# Patient Record
Sex: MALE | Race: WHITE | NOT HISPANIC OR LATINO | Employment: OTHER | URBAN - METROPOLITAN AREA
[De-identification: names, ages, dates, MRNs, and addresses within clinical notes are randomized per-mention and may not be internally consistent; named-entity substitution may affect disease eponyms.]

---

## 2023-12-21 ENCOUNTER — TELEPHONE (OUTPATIENT)
Dept: PRIMARY CARE | Facility: CLINIC | Age: 88
End: 2023-12-21

## 2023-12-21 DIAGNOSIS — Z00.00 WELLNESS EXAMINATION: Primary | ICD-10-CM

## 2023-12-21 NOTE — TELEPHONE ENCOUNTER
Patient forwarded an email to me regarding refill on lisinopril 5 mg tablet, to be sent to [facility].    Please contact patient to clarify request as well as his plans regarding future PCP    Abdoulaye Mckay MD

## 2023-12-22 RX ORDER — ATORVASTATIN CALCIUM 80 MG/1
0.5 TABLET, FILM COATED ORAL DAILY
COMMUNITY
Start: 2012-11-20

## 2023-12-22 RX ORDER — ACETAMINOPHEN 500 MG
1 TABLET ORAL DAILY
COMMUNITY
Start: 2018-10-04 | End: 2024-05-06 | Stop reason: DRUGHIGH

## 2023-12-22 RX ORDER — LATANOPROST 50 UG/ML
1 SOLUTION/ DROPS OPHTHALMIC NIGHTLY
COMMUNITY
Start: 2023-11-08

## 2023-12-22 RX ORDER — FERROUS SULFATE 325(65) MG
325 TABLET ORAL
COMMUNITY
Start: 2022-05-10

## 2023-12-22 RX ORDER — MEMANTINE HYDROCHLORIDE 10 MG/1
10 TABLET ORAL 2 TIMES DAILY
COMMUNITY
Start: 2023-08-22 | End: 2024-08-21

## 2023-12-22 RX ORDER — LISINOPRIL 5 MG/1
5 TABLET ORAL
COMMUNITY
Start: 2023-06-29 | End: 2023-12-22 | Stop reason: ALTCHOICE

## 2023-12-22 RX ORDER — DORZOLAMIDE HYDROCHLORIDE AND TIMOLOL MALEATE 20; 5 MG/ML; MG/ML
SOLUTION/ DROPS OPHTHALMIC
COMMUNITY
Start: 2023-11-07 | End: 2024-05-06 | Stop reason: ALTCHOICE

## 2023-12-22 RX ORDER — DOXYCYCLINE 100 MG/1
50 TABLET ORAL DAILY
COMMUNITY
End: 2024-02-27 | Stop reason: ALTCHOICE

## 2023-12-22 RX ORDER — DONEPEZIL HYDROCHLORIDE 10 MG/1
1 TABLET, FILM COATED ORAL NIGHTLY
COMMUNITY
Start: 2023-06-28

## 2023-12-22 RX ORDER — TALC
0.5 POWDER (GRAM) TOPICAL DAILY
COMMUNITY

## 2023-12-22 RX ORDER — AMLODIPINE BESYLATE 5 MG/1
1 TABLET ORAL DAILY
COMMUNITY
End: 2024-05-06 | Stop reason: ALTCHOICE

## 2023-12-22 RX ORDER — TRIAMTERENE/HYDROCHLOROTHIAZID 37.5-25 MG
1 TABLET ORAL DAILY
COMMUNITY
Start: 2022-05-10 | End: 2024-05-06 | Stop reason: SDUPTHER

## 2023-12-22 RX ORDER — TIMOLOL MALEATE 5 MG/ML
1 SOLUTION/ DROPS OPHTHALMIC 2 TIMES DAILY
COMMUNITY
Start: 2013-11-01

## 2023-12-22 RX ORDER — ALFUZOSIN HYDROCHLORIDE 10 MG/1
10 TABLET, EXTENDED RELEASE ORAL
COMMUNITY
Start: 2023-09-20 | End: 2024-05-06 | Stop reason: SDUPTHER

## 2023-12-22 RX ORDER — CETIRIZINE HYDROCHLORIDE 10 MG/1
10 TABLET ORAL
COMMUNITY

## 2023-12-22 NOTE — TELEPHONE ENCOUNTER
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

## 2023-12-22 NOTE — TELEPHONE ENCOUNTER
I last saw patient in June.    He is treated for hypertension, the blood pressure was low at last visit at which time I stopped amlodipine and continued lisinopril and triamterene/hydrochlorothiazide (see note).  Patient was to follow-up with me in October (did not do so).    He does have dementia with concern regarding medication compliance/understanding of proper medications to be taking.    Recommend patient schedule office visit with me within the next month and bring in all his medication bottles    Abdoulaye Mckay MD

## 2023-12-27 NOTE — TELEPHONE ENCOUNTER
Patient was advised to schedule appointment within the next month but he stated that he's in [place] until the 1st week of May.  He scheduled an office visit for 5-6-24.  Last MWV was 5-22-23.  He was also advised to bring in medication bottles.

## 2024-02-02 NOTE — TELEPHONE ENCOUNTER
I have ordered lab work which patient should complete a few days prior to his visit with me in May.  I spoke with patient.  He is aware to get fasting lab work 2 to 3 days prior.  I have sent him an email with this information at his request.    Abdoulaye Mckay MD

## 2024-02-27 DIAGNOSIS — L71.9 ROSACEA: Primary | ICD-10-CM

## 2024-02-27 RX ORDER — DOXYCYCLINE 100 MG/1
100 CAPSULE ORAL 2 TIMES DAILY
COMMUNITY
End: 2024-02-27 | Stop reason: DRUGHIGH

## 2024-02-27 RX ORDER — DOXYCYCLINE 100 MG/1
50 TABLET ORAL DAILY
Qty: 45 TABLET | Refills: 2 | Status: SHIPPED | OUTPATIENT
Start: 2024-02-27

## 2024-02-27 RX ORDER — DOXYCYCLINE 100 MG/1
100 CAPSULE ORAL 2 TIMES DAILY
Qty: 180 CAPSULE | Refills: 0 | Status: CANCELLED | OUTPATIENT
Start: 2024-02-27

## 2024-02-27 NOTE — TELEPHONE ENCOUNTER
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  Pended for [facility] (I couldn't add refills).

## 2024-05-06 ENCOUNTER — OFFICE VISIT (OUTPATIENT)
Dept: PRIMARY CARE | Facility: CLINIC | Age: 89
End: 2024-05-06
Payer: MEDICARE

## 2024-05-06 VITALS
DIASTOLIC BLOOD PRESSURE: 70 MMHG | WEIGHT: 149 LBS | HEIGHT: 68 IN | SYSTOLIC BLOOD PRESSURE: 124 MMHG | OXYGEN SATURATION: 97 % | BODY MASS INDEX: 22.58 KG/M2 | HEART RATE: 64 BPM

## 2024-05-06 DIAGNOSIS — E55.9 VITAMIN D DEFICIENCY: ICD-10-CM

## 2024-05-06 DIAGNOSIS — R26.9 GAIT DISORDER: ICD-10-CM

## 2024-05-06 DIAGNOSIS — G30.1 MODERATE LATE ONSET ALZHEIMER'S DEMENTIA WITHOUT BEHAVIORAL DISTURBANCE, PSYCHOTIC DISTURBANCE, MOOD DISTURBANCE, OR ANXIETY (MULTI): ICD-10-CM

## 2024-05-06 DIAGNOSIS — E78.2 MIXED HYPERLIPIDEMIA: ICD-10-CM

## 2024-05-06 DIAGNOSIS — I10 PRIMARY HYPERTENSION: ICD-10-CM

## 2024-05-06 DIAGNOSIS — L71.9 ROSACEA: ICD-10-CM

## 2024-05-06 DIAGNOSIS — L57.0 ACTINIC KERATOSES: ICD-10-CM

## 2024-05-06 DIAGNOSIS — R35.0 BENIGN PROSTATIC HYPERPLASIA WITH URINARY FREQUENCY: ICD-10-CM

## 2024-05-06 DIAGNOSIS — J30.89 NON-SEASONAL ALLERGIC RHINITIS, UNSPECIFIED TRIGGER: ICD-10-CM

## 2024-05-06 DIAGNOSIS — F02.B0 MODERATE LATE ONSET ALZHEIMER'S DEMENTIA WITHOUT BEHAVIORAL DISTURBANCE, PSYCHOTIC DISTURBANCE, MOOD DISTURBANCE, OR ANXIETY (MULTI): ICD-10-CM

## 2024-05-06 DIAGNOSIS — H40.1131 PRIMARY OPEN ANGLE GLAUCOMA (POAG) OF BOTH EYES, MILD STAGE: ICD-10-CM

## 2024-05-06 DIAGNOSIS — Z00.00 WELLNESS EXAMINATION: Primary | ICD-10-CM

## 2024-05-06 DIAGNOSIS — N40.1 BENIGN PROSTATIC HYPERPLASIA WITH URINARY FREQUENCY: ICD-10-CM

## 2024-05-06 PROBLEM — N40.0 BENIGN PROSTATIC HYPERPLASIA: Status: ACTIVE | Noted: 2024-05-06

## 2024-05-06 PROCEDURE — 1159F MED LIST DOCD IN RCRD: CPT | Performed by: INTERNAL MEDICINE

## 2024-05-06 PROCEDURE — 3074F SYST BP LT 130 MM HG: CPT | Performed by: INTERNAL MEDICINE

## 2024-05-06 PROCEDURE — 3078F DIAST BP <80 MM HG: CPT | Performed by: INTERNAL MEDICINE

## 2024-05-06 PROCEDURE — UHSPHYS PR UH SELECT PHYSICAL: Performed by: INTERNAL MEDICINE

## 2024-05-06 PROCEDURE — 1036F TOBACCO NON-USER: CPT | Performed by: INTERNAL MEDICINE

## 2024-05-06 RX ORDER — MINERAL OIL
180 ENEMA (ML) RECTAL DAILY
COMMUNITY
End: 2024-05-06 | Stop reason: ALTCHOICE

## 2024-05-06 RX ORDER — TRIAMTERENE/HYDROCHLOROTHIAZID 37.5-25 MG
TABLET ORAL
Start: 2024-05-06

## 2024-05-06 RX ORDER — CHOLECALCIFEROL (VITAMIN D3) 25 MCG
1000 TABLET ORAL DAILY
COMMUNITY

## 2024-05-06 RX ORDER — ALFUZOSIN HYDROCHLORIDE 10 MG/1
10 TABLET, EXTENDED RELEASE ORAL
Qty: 90 TABLET | Refills: 3 | Status: SHIPPED | OUTPATIENT
Start: 2024-05-06

## 2024-05-06 RX ORDER — LISINOPRIL 5 MG/1
5 TABLET ORAL DAILY
COMMUNITY

## 2024-05-06 RX ORDER — BISMUTH SUBSALICYLATE 262 MG
1 TABLET,CHEWABLE ORAL DAILY
COMMUNITY

## 2024-05-06 ASSESSMENT — ENCOUNTER SYMPTOMS
DIZZINESS: 0
ARTHRALGIAS: 0
RHINORRHEA: 0
DYSPHORIC MOOD: 0
LOSS OF SENSATION IN FEET: 0
HEMATURIA: 0
HEADACHES: 0
CONSTIPATION: 1
PALPITATIONS: 0
DYSURIA: 0
OCCASIONAL FEELINGS OF UNSTEADINESS: 0
NERVOUS/ANXIOUS: 0
FATIGUE: 0
COUGH: 0
FREQUENCY: 0
SHORTNESS OF BREATH: 0
BACK PAIN: 0
DIARRHEA: 0

## 2024-05-06 NOTE — PROGRESS NOTES
Subjective   Patient ID: Raul Funez is a 89 y.o. male who presents for Annual Exam.    Wellness exam/physical  (Established patient)    Primary hypertension  Blood pressure today is at goal.  Patient is currently taking lisinopril 5 mg daily and triamterene/hydrochlorothiazide, 1/2 tablet 4 days a week    Hyperlipidemia  Patient is on atorvastatin 40 mg daily.  He maintains well-balanced diet.  He exercises daily.    BPH (benign prostatic hypertrophy)  Urinary stream adequate.  No increased hesitancy or frequency.  He remains on alfuzosin 10 mg daily    Dementia, Alzheimer's type  Patient is on dual therapy with donepezil 10 mg daily and memantine 10 mg twice a day.  He last saw his [facility] neurologist, Dr. Morris, in September.  Patient admits to short-term memory loss.  During visit today, evidence of repeated questions noted.  Patient intermittently confused regarding current location.  I spoke with his wife.  She states memory appears somewhat stable.  Patient is still driving and recommendation for consideration of formal driving evaluation discussed.  Patient agreeable to second opinion and ongoing follow-up with  providers.    Gait disorder  Similar shuffling gait noted.  Patient with history of falls, though states he has not fallen recently.  He is not using any ambulatory device    Vitamin D deficiency  Patient is currently on vitamin D 1000 IU daily    Allergic rhinitis  Patient states he is allergic to family dog.  He is taking both Zyrtec 10 mg daily and Allegra 180 mg daily (1 in the morning, 1 in the evening, does not recall which he takes at which time a day).  Duplication of therapy and potential side effects (increased memory loss, urinary retention).  Patient is somewhat adamant that this regimen is working well and he chooses to continue.    Bilateral glaucoma  Routine follow-up with ophthalmology, Dr. Orta    Actinic keratoses  Rosacea  Last dermatology appointment was 2 years ago  "with Dr. Boyd at [facility].  Patient recommended to see dermatology and will see  provider.    Health maintenance  Exercise: Daily exercise   Ophthalmology: See above  Dentistry: Up-to-date  Dermatology: See above    Social   and lives with his wife, spends 6 months in [place] at [place] (October through April)  Education: [employer] for undergrad and [employer] for medical school, graduate in 1960  Occupation: Retired primary care/cardiologist           Review of Systems   Constitutional:  Negative for fatigue.   HENT:  Negative for postnasal drip and rhinorrhea.    Respiratory:  Negative for cough and shortness of breath.    Cardiovascular:  Negative for chest pain, palpitations and leg swelling.   Gastrointestinal:  Positive for constipation. Negative for diarrhea.   Genitourinary:  Negative for dysuria, frequency and hematuria.   Musculoskeletal:  Positive for gait problem. Negative for arthralgias and back pain.   Neurological:  Negative for dizziness and headaches.   Psychiatric/Behavioral:  Negative for dysphoric mood. The patient is not nervous/anxious.        Objective   /70 (BP Location: Left arm, Patient Position: Sitting, BP Cuff Size: Adult)   Pulse 64   Ht 1.715 m (5' 7.5\")   Wt 67.6 kg (149 lb)   SpO2 97%   BMI 22.99 kg/m²     Physical Exam  Vitals reviewed.   HENT:      Head: Normocephalic.      Right Ear: Tympanic membrane normal.      Left Ear: Tympanic membrane normal.      Mouth/Throat:      Mouth: Mucous membranes are moist.   Eyes:      Conjunctiva/sclera: Conjunctivae normal.      Pupils: Pupils are equal, round, and reactive to light.   Neck:      Vascular: No carotid bruit.   Cardiovascular:      Rate and Rhythm: Normal rate and regular rhythm.      Heart sounds: No murmur heard.  Pulmonary:      Effort: Pulmonary effort is normal.      Breath sounds: Normal breath sounds. No rales.   Abdominal:      General: Bowel sounds are normal.      Tenderness: There is no " abdominal tenderness.   Musculoskeletal:         General: Normal range of motion.      Right lower leg: No edema.      Left lower leg: No edema.   Lymphadenopathy:      Cervical: No cervical adenopathy.   Skin:     General: Skin is warm.   Neurological:      Mental Status: He is alert.      Comments: Oriented to person and place  Normal speech and facial movement  Motor exam: 5/5 in all 4 extremities  DTRs: 1+ in all 4 extremities  Coordination: Small steps with shuffling gait.  No tremor.  No upper extremity stiffness or cogwheeling         Assessment/Plan     Wellness exam/physical  Well-balanced diet rich in fruits, vegetables, fiber, lean protein recommended  Continued routine follow-up with dentistry recommended  Tdap vaccine recommended (to receive at pharmacy)  COVID-19 booster vaccine recommended (to receive at pharmacy)  Providing copy of advance directives (DURABLE POWER OF  for healthcare) to place in chart recommended    Primary hypertension  Continue current medication regimen with lisinopril 5 mg daily and triamterene/hydrochlorothiazide, 1/2 tablet 3 days a week on Monday, Wednesday, Friday  Monitor and bring home blood pressure readings and device to next visit    Hyperlipidemia  Continue atorvastatin  Fasting lab work ordered  Maintain proper low-fat/cholesterol, high-fiber diet    BPH (benign prostatic hypertrophy)  Continue alfuzosin 10 mg daily    Dementia, Alzheimer's type  Continue donepezil 10 mg daily and memantine 10 mg twice a day  Referral to [facility] for memory evaluation  Recommendation for driving evaluation discussed with wife    Gait disorder  Continue fall precautions are recommended  Use of ambulatory device such as cane or walker/rollator is recommended    Vitamin D deficiency  Continue vitamin D 1000 IU daily  Vitamin D lab work ordered    Allergic rhinitis  Continue Zyrtec 10 mg in the morning and Allegra 180 mg in the evening (reducing to one of the other medication  recommended, the patient to continue current therapy)    Bilateral glaucoma  Continue current glaucoma eyedrops  Continue routine follow-up with ophthalmology, Dr. Orta    Actinic keratoses  Rosacea  Recommend annual dermatology follow-up for full body skin exam, appointment to be scheduled with  dermatology    Follow-up  1.  Office visit in September 2.  Wellness exam/physical in 1 year, on/after 5/6/2025    Abdoulaye Mckay MD

## 2024-05-06 NOTE — PATIENT INSTRUCTIONS
Wellness exam/physical  Well-balanced diet rich in fruits, vegetables, fiber, lean protein recommended  Continued routine follow-up with dentistry recommended  Tdap vaccine recommended (to receive at pharmacy)  COVID-19 booster vaccine recommended (to receive at pharmacy)  Providing copy of advance directives (DURABLE POWER OF  for healthcare) to place in chart recommended    Primary hypertension  Continue current medication regimen with lisinopril 5 mg daily and triamterene/hydrochlorothiazide, 1/2 tablet 3 days a week on Monday, Wednesday, Friday  Monitor and bring home blood pressure readings and device to next visit    Hyperlipidemia  Continue atorvastatin  Fasting lab work ordered  Maintain proper low-fat/cholesterol, high-fiber diet    BPH (benign prostatic hypertrophy)  Continue alfuzosin 10 mg daily    Dementia, Alzheimer's type  Continue donepezil 10 mg daily and memantine 10 mg twice a day  Referral to [facility] for memory evaluation    Gait disorder  Continue fall precautions are recommended  Use of ambulatory device such as cane or walker/rollator is recommended    Vitamin D deficiency  Continue vitamin D 1000 IU daily  Vitamin D lab work ordered    Allergic rhinitis  Continue Zyrtec 10 mg in the morning and Allegra 180 mg in the evening (reducing to one of the other medication recommended, the patient to continue current therapy)    Bilateral glaucoma  Continue current glaucoma eyedrops  Continue routine follow-up with ophthalmology, Dr. Orta    Actinic keratoses  Rosacea  Recommend annual dermatology follow-up for full body skin exam, appointment to be scheduled with  dermatology    Follow-up  1.  Office visit in September 2.  Wellness exam/physical in 1 year, on/after 5/6/2025

## 2024-05-06 NOTE — Clinical Note
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 He is looking to switch his primary care to .  I was hoping you might see him for an office visit/consult. He is in [place] October through April. Thank you and let me know.  Much appreciated.  Allen

## 2024-05-28 ENCOUNTER — TELEPHONE (OUTPATIENT)
Dept: PRIMARY CARE | Facility: CLINIC | Age: 89
End: 2024-05-28
Payer: MEDICARE

## 2024-05-28 DIAGNOSIS — H40.1131 PRIMARY OPEN ANGLE GLAUCOMA (POAG) OF BOTH EYES, MILD STAGE: ICD-10-CM

## 2024-05-28 NOTE — TELEPHONE ENCOUNTER
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  Order pended.  He just saw Dr. Person ([facility]) on 5-20-24.

## 2024-06-10 PROBLEM — I10 HYPERTENSION: Status: ACTIVE | Noted: 2024-06-10

## 2024-06-10 PROBLEM — R33.9 URINARY RETENTION: Status: RESOLVED | Noted: 2024-06-10 | Resolved: 2024-06-10

## 2024-06-10 PROBLEM — F03.90 DEMENTIA WITHOUT BEHAVIORAL DISTURBANCE (MULTI): Status: ACTIVE | Noted: 2023-09-13

## 2024-06-10 PROBLEM — K05.10 CHRONIC GINGIVITIS: Status: ACTIVE | Noted: 2020-11-18

## 2024-06-10 PROBLEM — R29.6 FALLS FREQUENTLY: Status: ACTIVE | Noted: 2023-06-19

## 2024-06-10 PROBLEM — M25.512 SHOULDER PAIN, LEFT: Status: ACTIVE | Noted: 2021-06-24

## 2024-06-10 PROBLEM — N52.9 ORGANIC IMPOTENCE: Status: ACTIVE | Noted: 2024-06-10

## 2024-06-10 PROBLEM — T70.29XA: Status: ACTIVE | Noted: 2019-08-05

## 2024-06-10 PROBLEM — R26.9 GAIT ABNORMALITY: Status: ACTIVE | Noted: 2020-11-24

## 2024-06-10 PROBLEM — E78.2 MIXED HYPERLIPIDEMIA: Status: ACTIVE | Noted: 2018-11-15

## 2024-06-10 PROBLEM — N40.1 BENIGN PROSTATIC HYPERPLASIA WITH URINARY FREQUENCY: Status: ACTIVE | Noted: 2018-11-15

## 2024-06-10 PROBLEM — L71.9 ROSACEA: Status: ACTIVE | Noted: 2020-11-18

## 2024-06-10 PROBLEM — H40.9 GLAUCOMA: Status: ACTIVE | Noted: 2020-11-18

## 2024-06-10 PROBLEM — E55.9 VITAMIN D DEFICIENCY: Status: ACTIVE | Noted: 2018-11-15

## 2024-06-10 PROBLEM — R35.0 BENIGN PROSTATIC HYPERPLASIA WITH URINARY FREQUENCY: Status: ACTIVE | Noted: 2018-11-15

## 2024-06-10 PROBLEM — H92.02 EARACHE ON LEFT: Status: ACTIVE | Noted: 2024-06-10

## 2024-06-10 PROBLEM — G31.84 MCI (MILD COGNITIVE IMPAIRMENT) WITH MEMORY LOSS: Status: ACTIVE | Noted: 2022-05-10

## 2024-06-10 PROBLEM — I65.29 CAROTID ATHEROSCLEROSIS: Status: ACTIVE | Noted: 2020-11-18

## 2024-06-10 PROBLEM — R33.8 ACUTE URINARY RETENTION: Status: RESOLVED | Noted: 2024-06-10 | Resolved: 2024-06-10

## 2024-06-10 PROBLEM — I10 BENIGN ESSENTIAL HYPERTENSION: Status: ACTIVE | Noted: 2018-11-15

## 2024-06-10 PROBLEM — L30.9 DERMATITIS: Status: ACTIVE | Noted: 2022-05-10

## 2024-06-10 PROBLEM — N41.9 PROSTATITIS: Status: ACTIVE | Noted: 2024-06-10

## 2024-06-10 PROBLEM — R97.20 HIGH PROSTATE SPECIFIC ANTIGEN (PSA): Status: ACTIVE | Noted: 2020-11-18

## 2024-06-10 PROBLEM — M25.511 PAIN IN JOINT OF RIGHT SHOULDER: Status: ACTIVE | Noted: 2020-11-18

## 2024-06-10 PROBLEM — Z98.890 HX OF DECOMPRESSIVE LUMBAR LAMINECTOMY: Status: ACTIVE | Noted: 2020-05-27

## 2024-06-10 PROBLEM — D22.5 MELANOCYTIC NEVI OF TRUNK: Status: ACTIVE | Noted: 2018-06-15

## 2024-06-10 PROBLEM — L82.1 OTHER SEBORRHEIC KERATOSIS: Status: ACTIVE | Noted: 2018-06-15

## 2024-06-10 PROBLEM — H61.20 WAX IN EAR: Status: RESOLVED | Noted: 2024-06-10 | Resolved: 2024-06-10

## 2024-06-10 PROBLEM — N52.9 ERECTILE DYSFUNCTION: Status: ACTIVE | Noted: 2020-11-18

## 2024-06-10 PROBLEM — H61.22 IMPACTED CERUMEN OF LEFT EAR: Status: ACTIVE | Noted: 2020-11-18

## 2024-06-10 PROBLEM — E78.5 HYPERLIPIDEMIA: Status: ACTIVE | Noted: 2024-06-10

## 2024-06-10 PROBLEM — R26.89 BALANCE DISORDER: Status: ACTIVE | Noted: 2023-06-19

## 2024-06-10 PROBLEM — M48.061 SPINAL STENOSIS OF LUMBAR REGION: Status: ACTIVE | Noted: 2020-05-27

## 2024-06-10 PROBLEM — S62.618A CLOSED FRACTURE OF PROXIMAL PHALANX OF LITTLE FINGER: Status: RESOLVED | Noted: 2021-04-14 | Resolved: 2024-06-10

## 2024-06-25 ENCOUNTER — APPOINTMENT (OUTPATIENT)
Dept: GERIATRIC MEDICINE | Facility: CLINIC | Age: 89
End: 2024-06-25
Payer: MEDICARE

## 2024-08-16 DIAGNOSIS — G30.1 MODERATE LATE ONSET ALZHEIMER'S DEMENTIA WITHOUT BEHAVIORAL DISTURBANCE, PSYCHOTIC DISTURBANCE, MOOD DISTURBANCE, OR ANXIETY (MULTI): ICD-10-CM

## 2024-08-16 DIAGNOSIS — F02.B0 MODERATE LATE ONSET ALZHEIMER'S DEMENTIA WITHOUT BEHAVIORAL DISTURBANCE, PSYCHOTIC DISTURBANCE, MOOD DISTURBANCE, OR ANXIETY (MULTI): ICD-10-CM

## 2024-08-16 RX ORDER — DONEPEZIL HYDROCHLORIDE 10 MG/1
10 TABLET, FILM COATED ORAL NIGHTLY
Qty: 90 TABLET | Refills: 3 | Status: SHIPPED | OUTPATIENT
Start: 2024-08-16

## 2024-09-16 ENCOUNTER — LAB (OUTPATIENT)
Dept: LAB | Facility: LAB | Age: 89
End: 2024-09-16
Payer: MEDICARE

## 2024-09-16 ENCOUNTER — APPOINTMENT (OUTPATIENT)
Dept: PRIMARY CARE | Facility: CLINIC | Age: 89
End: 2024-09-16
Payer: MEDICARE

## 2024-09-16 VITALS
HEART RATE: 52 BPM | BODY MASS INDEX: 22.68 KG/M2 | SYSTOLIC BLOOD PRESSURE: 128 MMHG | OXYGEN SATURATION: 96 % | DIASTOLIC BLOOD PRESSURE: 62 MMHG | WEIGHT: 147 LBS

## 2024-09-16 DIAGNOSIS — G30.1 MODERATE LATE ONSET ALZHEIMER'S DEMENTIA WITHOUT BEHAVIORAL DISTURBANCE, PSYCHOTIC DISTURBANCE, MOOD DISTURBANCE, OR ANXIETY (MULTI): ICD-10-CM

## 2024-09-16 DIAGNOSIS — E78.2 MIXED HYPERLIPIDEMIA: ICD-10-CM

## 2024-09-16 DIAGNOSIS — E55.9 VITAMIN D DEFICIENCY: ICD-10-CM

## 2024-09-16 DIAGNOSIS — I10 PRIMARY HYPERTENSION: ICD-10-CM

## 2024-09-16 DIAGNOSIS — F02.B0 MODERATE LATE ONSET ALZHEIMER'S DEMENTIA WITHOUT BEHAVIORAL DISTURBANCE, PSYCHOTIC DISTURBANCE, MOOD DISTURBANCE, OR ANXIETY (MULTI): ICD-10-CM

## 2024-09-16 DIAGNOSIS — R26.9 GAIT ABNORMALITY: ICD-10-CM

## 2024-09-16 DIAGNOSIS — J30.89 NON-SEASONAL ALLERGIC RHINITIS, UNSPECIFIED TRIGGER: ICD-10-CM

## 2024-09-16 DIAGNOSIS — N40.1 BENIGN PROSTATIC HYPERPLASIA WITH URINARY FREQUENCY: ICD-10-CM

## 2024-09-16 DIAGNOSIS — R35.0 BENIGN PROSTATIC HYPERPLASIA WITH URINARY FREQUENCY: ICD-10-CM

## 2024-09-16 DIAGNOSIS — I10 PRIMARY HYPERTENSION: Primary | ICD-10-CM

## 2024-09-16 PROBLEM — T70.29XA: Status: RESOLVED | Noted: 2019-08-05 | Resolved: 2024-09-16

## 2024-09-16 PROBLEM — G31.84 MCI (MILD COGNITIVE IMPAIRMENT) WITH MEMORY LOSS: Status: RESOLVED | Noted: 2022-05-10 | Resolved: 2024-09-16

## 2024-09-16 PROBLEM — M25.512 SHOULDER PAIN, LEFT: Status: RESOLVED | Noted: 2021-06-24 | Resolved: 2024-09-16

## 2024-09-16 PROBLEM — H61.22 IMPACTED CERUMEN OF LEFT EAR: Status: RESOLVED | Noted: 2020-11-18 | Resolved: 2024-09-16

## 2024-09-16 PROBLEM — E78.5 HYPERLIPIDEMIA: Status: RESOLVED | Noted: 2024-06-10 | Resolved: 2024-09-16

## 2024-09-16 PROBLEM — N41.9 PROSTATITIS: Status: RESOLVED | Noted: 2024-06-10 | Resolved: 2024-09-16

## 2024-09-16 PROBLEM — F03.90 DEMENTIA WITHOUT BEHAVIORAL DISTURBANCE (MULTI): Status: RESOLVED | Noted: 2023-09-13 | Resolved: 2024-09-16

## 2024-09-16 PROBLEM — R29.6 FALLS FREQUENTLY: Status: RESOLVED | Noted: 2023-06-19 | Resolved: 2024-09-16

## 2024-09-16 PROBLEM — N52.9 ORGANIC IMPOTENCE: Status: RESOLVED | Noted: 2024-06-10 | Resolved: 2024-09-16

## 2024-09-16 PROBLEM — R26.89 BALANCE DISORDER: Status: RESOLVED | Noted: 2023-06-19 | Resolved: 2024-09-16

## 2024-09-16 PROBLEM — H40.9 GLAUCOMA: Status: RESOLVED | Noted: 2020-11-18 | Resolved: 2024-09-16

## 2024-09-16 LAB
APPEARANCE UR: CLEAR
BILIRUB UR STRIP.AUTO-MCNC: NEGATIVE MG/DL
COLOR UR: YELLOW
GLUCOSE UR STRIP.AUTO-MCNC: NORMAL MG/DL
KETONES UR STRIP.AUTO-MCNC: NEGATIVE MG/DL
LEUKOCYTE ESTERASE UR QL STRIP.AUTO: NEGATIVE
NITRITE UR QL STRIP.AUTO: NEGATIVE
PH UR STRIP.AUTO: 6.5 [PH]
PROT UR STRIP.AUTO-MCNC: NEGATIVE MG/DL
RBC # UR STRIP.AUTO: NEGATIVE /UL
SP GR UR STRIP.AUTO: 1.02
UROBILINOGEN UR STRIP.AUTO-MCNC: NORMAL MG/DL

## 2024-09-16 PROCEDURE — 36415 COLL VENOUS BLD VENIPUNCTURE: CPT

## 2024-09-16 PROCEDURE — 99214 OFFICE O/P EST MOD 30 MIN: CPT | Performed by: INTERNAL MEDICINE

## 2024-09-16 PROCEDURE — 1159F MED LIST DOCD IN RCRD: CPT | Performed by: INTERNAL MEDICINE

## 2024-09-16 PROCEDURE — 3074F SYST BP LT 130 MM HG: CPT | Performed by: INTERNAL MEDICINE

## 2024-09-16 PROCEDURE — 3078F DIAST BP <80 MM HG: CPT | Performed by: INTERNAL MEDICINE

## 2024-09-16 RX ORDER — ATORVASTATIN CALCIUM 80 MG/1
40 TABLET, FILM COATED ORAL DAILY
Qty: 45 TABLET | Refills: 3 | Status: SHIPPED | OUTPATIENT
Start: 2024-09-16

## 2024-09-16 NOTE — PATIENT INSTRUCTIONS
Primary hypertension  Continue current medication regimen with lisinopril 5 mg daily and triamterene/hydrochlorothiazide, 1/2 tablet 3 days a week on Monday, Wednesday, Friday    Hyperlipidemia  Continue atorvastatin  Lab work ordered to complete today.  Maintain proper low-fat/cholesterol, high-fiber diet     BPH (benign prostatic hypertrophy)  Continue alfuzosin 10 mg daily     Dementia, Alzheimer's type  Continue donepezil 10 mg daily and memantine 10 mg twice a day  Referral to [facility] for memory evaluation  Recommendation for driving evaluation given.     Gait disorder  Continue fall precautions are recommended  Use of ambulatory device such as cane or walker/rollator is recommended     Vitamin D deficiency  Continue vitamin D 1000 IU daily     Allergic rhinitis  Continue Allegra     Bilateral glaucoma  Continue current glaucoma eyedrops  Continue routine follow-up with ophthalmology, Dr. Orta.  Next visit due in October (next month)     Actinic keratoses  Rosacea  Dermatology visit for full body skin exam is scheduled with Dr. Sánchez.    Health maintenance  COVID-19 and high-dose influenza vaccines are recommended to receive this month.    Follow-up  Wellness exam/physical in May 2025

## 2024-09-16 NOTE — PROGRESS NOTES
Subjective   Patient ID: Raul Funez is a 89 y.o. male who presents for Follow-up.    Routine follow-up    Primary hypertension  Blood pressure stable today on current regimen.    Hyperlipidemia  Lab work is due, never completed at time of physical.  Patient is compliant with atorvastatin.  Maintaining proper diet.     BPH (benign prostatic hypertrophy)  Urinary stream adequate.  Patient denies any dysuria.    Dementia, Alzheimer's type  Patient is on both donepezil and memantine  In answering questions, patient initially states he does not know, then is able to recall.  He is driving, denies any moving violations or accidents.  At previous visit, driving evaluation recommended.  I have again suggested to patient today.  I have also contacted his wife by phone and have provided driving evaluation option for testing.  Appointment with Dr. Carter pena was canceled in June, we will try to get an evaluation scheduled before he leaves for [place] in October.     Gait disorder  Patient denies any falls.  Not using any ambulatory device today.    Vitamin D deficiency  Currently on vitamin D 1000 IU daily     Allergic rhinitis  He denies any current symptoms, using Allegra     Bilateral glaucoma  Last visit with ophthalmology, Dr. Orta, was in May, due for appointment next month.  Wife has been advised to have patient's schedule.     Actinic keratoses  Rosacea  Dermatology visit for full body skin exam is scheduled with Dr. Sánchez.    Health maintenance  COVID-19 and high-dose influenza vaccines are recommended to receive this month.         Review of Systems   Constitutional:  Negative for fatigue.   Respiratory:  Negative for shortness of breath.    Cardiovascular:  Negative for chest pain and palpitations.   Gastrointestinal:  Negative for constipation and diarrhea.   Genitourinary:  Negative for dysuria and hematuria.   Musculoskeletal:  Positive for gait problem. Negative for arthralgias and back pain.    Neurological:  Negative for dizziness, weakness and headaches.       Objective   /62 (BP Location: Left arm, Patient Position: Sitting, BP Cuff Size: Adult)   Pulse 52   Wt 66.7 kg (147 lb)   SpO2 96%   BMI 22.68 kg/m²     Physical Exam  Vitals reviewed.   HENT:      Head: Normocephalic.      Mouth/Throat:      Mouth: Mucous membranes are moist.   Cardiovascular:      Rate and Rhythm: Normal rate and regular rhythm.   Pulmonary:      Effort: Pulmonary effort is normal.      Breath sounds: Normal breath sounds.   Abdominal:      General: Bowel sounds are normal.      Palpations: Abdomen is soft.      Tenderness: There is no abdominal tenderness.   Musculoskeletal:      Right lower leg: No edema.      Left lower leg: No edema.   Neurological:      Mental Status: He is alert.      Comments: Facial movement symmetric  Normal speech  Normal motor strength in all 4 extremities  Gait: Short shuffling gait  Oriented to person and place         Assessment/Plan     Primary hypertension  Continue current medication regimen with lisinopril 5 mg daily and triamterene/hydrochlorothiazide, 1/2 tablet 3 days a week on Monday, Wednesday, Friday    Hyperlipidemia  Continue atorvastatin  Lab work ordered to complete today.  Maintain proper low-fat/cholesterol, high-fiber diet     BPH (benign prostatic hypertrophy)  Continue alfuzosin 10 mg daily     Dementia, Alzheimer's type  Continue donepezil 10 mg daily and memantine 10 mg twice a day  Referral to [facility] for memory evaluation  Recommendation for driving evaluation given.  I have spoken with the patient as well as his wife and given options for evaluation.     Gait disorder  Continue fall precautions are recommended  Use of ambulatory device such as cane or walker/rollator is recommended     Vitamin D deficiency  Continue vitamin D 1000 IU daily     Allergic rhinitis  Continue Allegra     Bilateral glaucoma  Continue current glaucoma eyedrops  Continue routine follow-up  with ophthalmology, Dr. Orta.  Next visit due in October (next month)     Actinic keratoses  Rosacea  Dermatology visit for full body skin exam is scheduled with Dr. Sánchez.    Health maintenance  COVID-19 and high-dose influenza vaccines are recommended to receive this month.    Follow-up  Wellness exam/physical in May 2025    Abdoulaye Mckay MD

## 2024-09-16 NOTE — Clinical Note
Bharati, I hope that your summer has gone well. I previously referred this patient to in May and you were kind enough to schedule him in June (though he may have canceled the appointment). He is a retired  cardiologist with dementia.  He and his wife return to [place] sometime in October/November. I am checking to see if you might have a cancellation or way that he can see you prior to his departure. If there is one of your colleagues that is available, that would be fine as well. Thank you so much for your consideration. Allen

## 2024-09-17 LAB
25(OH)D3 SERPL-MCNC: 76 NG/ML (ref 30–100)
ALBUMIN SERPL BCP-MCNC: 4 G/DL (ref 3.4–5)
ALP SERPL-CCNC: 56 U/L (ref 33–136)
ALT SERPL W P-5'-P-CCNC: 17 U/L (ref 10–52)
ANION GAP SERPL CALC-SCNC: 14 MMOL/L (ref 10–20)
AST SERPL W P-5'-P-CCNC: 21 U/L (ref 9–39)
BASOPHILS # BLD AUTO: 0.06 X10*3/UL (ref 0–0.1)
BASOPHILS NFR BLD AUTO: 0.8 %
BILIRUB SERPL-MCNC: 0.5 MG/DL (ref 0–1.2)
BUN SERPL-MCNC: 16 MG/DL (ref 6–23)
CALCIUM SERPL-MCNC: 9.3 MG/DL (ref 8.6–10.6)
CHLORIDE SERPL-SCNC: 104 MMOL/L (ref 98–107)
CHOLEST SERPL-MCNC: 146 MG/DL (ref 0–199)
CHOLESTEROL/HDL RATIO: 2.5
CO2 SERPL-SCNC: 27 MMOL/L (ref 21–32)
CREAT SERPL-MCNC: 1.01 MG/DL (ref 0.5–1.3)
EGFRCR SERPLBLD CKD-EPI 2021: 71 ML/MIN/1.73M*2
EOSINOPHIL # BLD AUTO: 0.24 X10*3/UL (ref 0–0.4)
EOSINOPHIL NFR BLD AUTO: 3.4 %
ERYTHROCYTE [DISTWIDTH] IN BLOOD BY AUTOMATED COUNT: 13.8 % (ref 11.5–14.5)
GLUCOSE SERPL-MCNC: 95 MG/DL (ref 74–99)
HCT VFR BLD AUTO: 41.3 % (ref 41–52)
HDLC SERPL-MCNC: 58.5 MG/DL
HGB BLD-MCNC: 13.3 G/DL (ref 13.5–17.5)
IMM GRANULOCYTES # BLD AUTO: 0.04 X10*3/UL (ref 0–0.5)
IMM GRANULOCYTES NFR BLD AUTO: 0.6 % (ref 0–0.9)
LDLC SERPL CALC-MCNC: 64 MG/DL
LYMPHOCYTES # BLD AUTO: 1.85 X10*3/UL (ref 0.8–3)
LYMPHOCYTES NFR BLD AUTO: 26.2 %
MCH RBC QN AUTO: 31.1 PG (ref 26–34)
MCHC RBC AUTO-ENTMCNC: 32.2 G/DL (ref 32–36)
MCV RBC AUTO: 97 FL (ref 80–100)
MONOCYTES # BLD AUTO: 0.68 X10*3/UL (ref 0.05–0.8)
MONOCYTES NFR BLD AUTO: 9.6 %
NEUTROPHILS # BLD AUTO: 4.2 X10*3/UL (ref 1.6–5.5)
NEUTROPHILS NFR BLD AUTO: 59.4 %
NON HDL CHOLESTEROL: 88 MG/DL (ref 0–149)
NRBC BLD-RTO: 0 /100 WBCS (ref 0–0)
PLATELET # BLD AUTO: 307 X10*3/UL (ref 150–450)
POTASSIUM SERPL-SCNC: 4.6 MMOL/L (ref 3.5–5.3)
PROT SERPL-MCNC: 6 G/DL (ref 6.4–8.2)
RBC # BLD AUTO: 4.28 X10*6/UL (ref 4.5–5.9)
SODIUM SERPL-SCNC: 140 MMOL/L (ref 136–145)
TRIGL SERPL-MCNC: 119 MG/DL (ref 0–149)
TSH SERPL-ACNC: 2.36 MIU/L (ref 0.44–3.98)
VIT B12 SERPL-MCNC: 554 PG/ML (ref 211–911)
VLDL: 24 MG/DL (ref 0–40)
WBC # BLD AUTO: 7.1 X10*3/UL (ref 4.4–11.3)

## 2024-09-20 ASSESSMENT — ENCOUNTER SYMPTOMS
BACK PAIN: 0
WEAKNESS: 0
HEADACHES: 0
HEMATURIA: 0
ARTHRALGIAS: 0
PALPITATIONS: 0
CONSTIPATION: 0
DIZZINESS: 0
SHORTNESS OF BREATH: 0
DIARRHEA: 0
DYSURIA: 0
FATIGUE: 0

## 2024-09-30 ENCOUNTER — APPOINTMENT (OUTPATIENT)
Dept: DERMATOLOGY | Facility: CLINIC | Age: 89
End: 2024-09-30
Payer: MEDICARE

## 2024-09-30 DIAGNOSIS — D22.9 MULTIPLE BENIGN NEVI: ICD-10-CM

## 2024-09-30 DIAGNOSIS — L57.0 ACTINIC KERATOSIS: Primary | ICD-10-CM

## 2024-09-30 PROCEDURE — 1159F MED LIST DOCD IN RCRD: CPT | Performed by: STUDENT IN AN ORGANIZED HEALTH CARE EDUCATION/TRAINING PROGRAM

## 2024-09-30 PROCEDURE — 99213 OFFICE O/P EST LOW 20 MIN: CPT | Performed by: STUDENT IN AN ORGANIZED HEALTH CARE EDUCATION/TRAINING PROGRAM

## 2024-09-30 ASSESSMENT — DERMATOLOGY PATIENT ASSESSMENT
HAVE YOU HAD OR DO YOU HAVE A STAPH INFECTION: NO
DO YOU USE A TANNING BED: NO
HAVE YOU HAD OR DO YOU HAVE VASCULAR DISEASE: NO
DO YOU USE SUNSCREEN: OCCASIONALLY
ARE YOU AN ORGAN TRANSPLANT RECIPIENT: NO
DO YOU HAVE ANY NEW OR CHANGING LESIONS: NO

## 2024-09-30 ASSESSMENT — PATIENT GLOBAL ASSESSMENT (PGA): PATIENT GLOBAL ASSESSMENT: PATIENT GLOBAL ASSESSMENT:  1 - CLEAR

## 2024-09-30 ASSESSMENT — DERMATOLOGY QUALITY OF LIFE (QOL) ASSESSMENT
RATE HOW EMOTIONALLY BOTHERED YOU ARE BY YOUR SKIN PROBLEM (FOR EXAMPLE, WORRY, EMBARRASSMENT, FRUSTRATION): 0 - NEVER BOTHERED
RATE HOW BOTHERED YOU ARE BY SYMPTOMS OF YOUR SKIN PROBLEM (EG, ITCHING, STINGING BURNING, HURTING OR SKIN IRRITATION): 0 - NEVER BOTHERED
DATE THE QUALITY-OF-LIFE ASSESSMENT WAS COMPLETED: 67113
RATE HOW BOTHERED YOU ARE BY EFFECTS OF YOUR SKIN PROBLEMS ON YOUR ACTIVITIES (EG, GOING OUT, ACCOMPLISHING WHAT YOU WANT, WORK ACTIVITIES OR YOUR RELATIONSHIPS WITH OTHERS): 0 - NEVER BOTHERED
ARE THERE EXCLUSIONS OR EXCEPTIONS FOR THE QUALITY OF LIFE ASSESSMENT: NO

## 2024-09-30 ASSESSMENT — ITCH NUMERIC RATING SCALE: HOW SEVERE IS YOUR ITCHING?: 0

## 2024-09-30 NOTE — PROGRESS NOTES
Subjective     Ron Funez is a 89 y.o. male who presents for the following: Skin Check.     Review of Systems:  No other skin or systemic complaints other than what is documented elsewhere in the note.    The following portions of the chart were reviewed this encounter and updated as appropriate:         Skin Cancer History  No skin cancer on file.      Specialty Problems          Dermatology Problems    Melanocytic nevi of trunk    Other seborrheic keratosis    Rosacea    Dermatitis        Objective   Well appearing patient in no apparent distress; mood and affect are within normal limits.    A full examination was performed including scalp, head, eyes, ears, nose, lips, neck, chest, axillae, abdomen, back, buttocks, bilateral upper extremities, bilateral lower extremities, hands, feet, fingers, toes, fingernails, and toenails. All findings within normal limits unless otherwise noted below.    Assessment/Plan   1. Actinic keratosis (3)  Mid Forehead, Mid Supratip of Nose, Right Buccal Cheek  Erythematous macules with gritty scale.    Destr of lesion - Mid Forehead, Mid Supratip of Nose, Right Buccal Cheek  Complexity: simple    Destruction method: cryotherapy    Informed consent: discussed and consent obtained    Lesion destroyed using liquid nitrogen: Yes    Region frozen until ice ball extended beyond lesion: Yes    Cryotherapy cycles:  1  Outcome: patient tolerated procedure well with no complications    Post-procedure details: wound care instructions given      2. Multiple benign nevi    Exam findings: Benign appearing macules and papules  Plan: monitor for any new or changing nevi. Notify me should this occur.  Over the counter use of sun screen product (30+ SPF with mineral sun screen) recommended

## 2024-10-09 ENCOUNTER — APPOINTMENT (OUTPATIENT)
Dept: OPHTHALMOLOGY | Facility: CLINIC | Age: 89
End: 2024-10-09
Payer: MEDICARE

## 2024-11-30 DIAGNOSIS — L71.9 ROSACEA: ICD-10-CM

## 2024-11-30 RX ORDER — DOXYCYCLINE 100 MG/1
50 TABLET ORAL DAILY
Qty: 45 TABLET | Refills: 2 | Status: SHIPPED | OUTPATIENT
Start: 2024-11-30

## 2025-03-01 DIAGNOSIS — L71.9 ROSACEA: ICD-10-CM

## 2025-03-01 RX ORDER — DOXYCYCLINE 100 MG/1
50 TABLET ORAL DAILY
Qty: 45 TABLET | Refills: 3 | Status: SHIPPED | OUTPATIENT
Start: 2025-03-01

## 2025-03-24 ENCOUNTER — TELEPHONE (OUTPATIENT)
Dept: PRIMARY CARE | Facility: CLINIC | Age: OVER 89
End: 2025-03-24
Payer: MEDICARE

## 2025-03-24 DIAGNOSIS — Z00.00 WELLNESS EXAMINATION: Primary | ICD-10-CM

## 2025-03-24 DIAGNOSIS — E78.2 MIXED HYPERLIPIDEMIA: ICD-10-CM

## 2025-03-24 RX ORDER — ATORVASTATIN CALCIUM 80 MG/1
40 TABLET, FILM COATED ORAL DAILY
Qty: 45 TABLET | Refills: 3 | Status: SHIPPED | OUTPATIENT
Start: 2025-03-24

## 2025-03-24 NOTE — TELEPHONE ENCOUNTER
Raul calling because he is out of his Atorvastatin and wasn't sure if he was suppose to continue and needed a refill?     Please advise

## 2025-03-24 NOTE — TELEPHONE ENCOUNTER
Message reviewed.  Patient should continue atorvastatin.  Prescription has been sent to [facility].  Also, patient has 2 dates for physical scheduled in May.  Please confirm which date he is planning to come and canceled the other date.  Finally, please remind patient to complete fasting lab work 3 to 5 days prior to physical, orders are in place.    Abdoulaye Mckay MD

## 2025-04-28 DIAGNOSIS — Z00.00 WELLNESS EXAMINATION: ICD-10-CM

## 2025-05-12 ENCOUNTER — APPOINTMENT (OUTPATIENT)
Dept: PRIMARY CARE | Facility: CLINIC | Age: OVER 89
End: 2025-05-12
Payer: MEDICARE

## 2025-05-13 ENCOUNTER — APPOINTMENT (OUTPATIENT)
Dept: GERIATRIC MEDICINE | Facility: CLINIC | Age: OVER 89
End: 2025-05-13
Payer: MEDICARE

## 2025-05-18 NOTE — PROGRESS NOTES
"Subjective   Mr. Funez 89 y.o. year old male is accompanied by: wife  Consult Requested By: patient's primary care physician, Abdoulaye Mckay MD   Reason for consultation or primary concern: New Patient Visit and Memory Loss    Saw PCP 9/2024 Dr. Mckay  \"Primary hypertension  Continue current medication regimen with lisinopril 5 mg daily and triamterene/hydrochlorothiazide, 1/2 tablet 3 days a week on Monday, Wednesday, Friday  Hyperlipidemia  Continue atorvastatin  Lab work ordered to complete today.  Maintain proper low-fat/cholesterol, high-fiber diet  BPH (benign prostatic hypertrophy)  Continue alfuzosin 10 mg daily  Dementia, Alzheimer's type  Continue donepezil 10 mg daily and memantine 10 mg twice a day  Referral to [facility] for memory evaluation  Recommendation for driving evaluation given.  I have spoken with the patient as well as his wife and given options for evaluation.  Gait disorder  Continue fall precautions are recommended  Use of ambulatory device such as cane or walker/rollator is recommended  Vitamin D deficiency  Continue vitamin D 1000 IU daily  Allergic rhinitis  Continue Allegra  Bilateral glaucoma  Continue current glaucoma eyedrops  Continue routine follow-up with ophthalmology, Dr. Orta.  Next visit due in October (next month)  Actinic keratoses  Rosacea  Dermatology visit for full body skin exam is scheduled with Dr. Sánchez.  Health maintenance  COVID-19 and high-dose influenza vaccines are recommended to receive this month.  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      The patient reports for the last three years he has experienced progressive difficulty with memory, balance and coordination. The memory problems are primarily short term. His wife has noted the problem as well. He is still able to pay the bills and " "navigate an automobile without difficulty. He has no difficulty with direction and has never lost his way while driving. He feels that his gait is wide based and his balance has declined.  He is less coordinated. Two yeears ago he had to gave tennis and he could not coordinate his gait, control his arm swing and keep his eye on the ball all at the same time.   A/P  Saw Dr. Morris for initial visit 5/2021 for cognitive decline  Cognitive decline. The small hippocampal size noted on volumetric MRI data suggests a neuro degenerative process. I screen labs including TSH, B12, MMA are ordered. Pending the result of neuro psych testing consider the addition of Aricept. \"    Saw Dr. Morris 5/2022  \"DATA:   MOCA 22/30   ASSESSMENT/PLAN:  Mild cognitive impairment   - clinically stable   - continue Aricept 10 mg   - reevaluate in one year. \"    HPI     Per patient and wife (obtained in addition due to dementia)  - pt is a retired cardiologist. Worked at  suburb  - memory not good because \"I'm an old fart\"  - forgets names of people and who people are even if well known to them. This is a change  - will sometimes ask same question. Not often  - not forgetting what he went into a room for  - doesn't remember what he had for dinner yesterday  - managing own medicines. Doing ok  - mostly wife does bills. Most are automatic.   - wife handling finances more over last 4-5 years.   - he can balance check book and keeps track of where he needs to write a check.   - good at writing notes. That helps him  - has never cooked. Does microwave coffee. No trouble  - is driving. No accidents or tickets. Not getting lost  - neither one of them don't manage well with remotes. Has 3-4 remotes. Has different ones in [place].   - has a tv in [place] that they can talk to.   - has an ipad. Does what he likes on it. Emails. Following up on things. Not a lot of searching on [employer].   - used to fix lamps. But hasn't any trouble with them " "recently  - has hearing aides.   - had a couple falls in past. Hasn't had any recently because doing exercises.   - exercise helped with memory   - reads magazines mostly. Newspaper. [employer]. Readers digest   - doesn't initiate conversation per wife  - pt not able to tell me what is going on in the news.     Has been on aricept for at least 2-3 years. Before memantine.   Been on memantine 10mg bid since possibly 9/2023  - pt and wife don't remember noticing anything majorly different after medicines.   Things relatively stable with medications     What matters most: the health of his wife  Health Goals:    Goals    None     Wants to remain as independent as possible.     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 Completely retired in 2013  . Currently his second wife      Per social work:  \"Patient is a retired cardiovascular physician. 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 Here with his wife, Gloria who is his POA. They have been  17 years, first marriage ended in divorce. They are currently updating their legal paperwork with an  so POA and Living Will are not in chart. He has a son and daughter who were adopted. His son lives in [place] but daughter is out of state. Parents both lived into their mid 90s.  They noted several years of cognitive decline but he was very engaged and a good .     Living environment: lives with wife in house. Has home in [place] also .     Is dependant or requires assistance in the following BADL: none  Is dependant or requires assistance in the following Instrumental ADL: still driving. Managing own meds. Can balance checkbook. Some trouble with remotes. Can use his ipad but only does the things he knows.     Visual: glasses Yes bifocals   Hearing: hearing     Dental: dentures No     Sleep: no problems sleeping    Advanced Directives on file: in the questionnaire was noted that he is a DNR-ml. Need to confirm       Medications " reviewed and reconciled.     Objective   /89   Pulse 69   Temp 36.2 °C (97.2 °F) (Tympanic)   Resp 16   Wt 68.3 kg (150 lb 8 oz)   BMI 23.22 kg/m²     Physical Exam  Constitutional:  Well-nourished, kempt  Head: NC/AT  Eyes: PERRL, EOMI.   ENT: TM's +LR b/l. Bilateral hearing aids    Dentition:multiple fillings. Some decay     Oropharyx: clear    Neck: supple. trachea midline. Thyroid not enlarged  Lymphatics: No adenopathy at neck  Respiratory: clear without rales, rhonchi or wheezes  Cardiovascular: RRR, +S1, S2, II/VI BRAD RUSB, JVD physiologic    Extremeties:  No clubbing, cyanosis. Trace=1+ pitting pedal edema  Gastrointestinal: +BS, soft, nontender.  Genitourinary: deferred  Integumentary: No ulcers, pressure sores, rash  Musculoskeletal: some sarcopenia in arms. Slightly kyphosis of spine    Contractures: none    Muscle bulk: decreased in arms bilaterally.    Digits/nails: toenails not examined    Effusions: none   Neurologic:    CNII-XII: nl    tone: nl     Strength UE: nl      LE:  nl    F->N: nl b/l    fine finger movements: nl b/l    Get up and go: pushes to stand. Slow gait. Shuffling initially to get going. Small step length. Somewhat stooped posture. Ok turn. Slightly reduced arm swing bilaterally. Narrow based   Psychologic: affect full     MoCA: 17/30  MoCA  Visuospatial/Executive: 3 (version 8.1. missed mini trails. cube was iffy. didn't give point. clock tiny but grossly normal. hands may not be exactly in center.)  Naming: 3  Memory (Score '0' as this is an Unscored Section): 0  Attention: Read List of Digits: 2  Attention: Read List of Letters: 1  Attention: Serial Sevens: 3  Language: Repeat: 1  Language: Fluency: 0 (4 words)  Abstraction: 2  Delayed Recall: 0 (MIS 5/15. got 2 missed wuth cat cues and 1 missed with mult choice.)  Orientation: 1 (got only city. [place]. said [facility]. got time orientations wrong)  Add 1 Point if </=12 yr Education: 0 (MD)  MOCA Total Score:  "16  CDT:    4-5/5  Animals in 1 minute: 16. Said chicken 3 times. Elephant 2x.   NPI short: no for everything.   SSISCB: 4/28  FAQ: no difficulty noted. But wrote not applicable for tax records, business affairs, shopping alone, household necessities, playing a game of skill or hobby, and preparing a balanced meal.   Lab Results   Component Value Date    TSH 2.36 09/16/2024     No results found for: \"FREET4\"  Lab Results   Component Value Date    AJQWOYNT76 554 09/16/2024     No results found for: \"HGBA1C\"  Lab Results   Component Value Date    VITD25 76 09/16/2024               Component  Ref Range & Units (hover) 8 mo ago  (9/16/24) 4 yr ago  (9/10/20) 4 yr ago  (8/5/20) 4 yr ago  (8/4/20) 4 yr ago  (8/3/20) 4 yr ago  (8/2/20)   Glucose 95 66 Low  110 High  105 High  65 Low  87   Sodium 140 133 Low  137 138 135 Low  136   Potassium 4.6 4.4 3.7 4.0 3.9 4.2   Chloride 104 98 106 105 103 99   Bicarbonate 27 26 26 25 23 29   Anion Gap 14 13 9 Low  12 13 12   Urea Nitrogen 16 18 11 13 14 12   Creatinine 1.01 0.88 0.87 1.15 0.92 0.85   eGFR 71        Comment: Calculations of estimated GFR are performed using the 2021 CKD-EPI Study Refit equation without the race variable for the IDMS-Traceable creatinine methods.  https://jasn.asnjournals.org/content/early/2021/09/22/ASN.9516668386   Calcium 9.3 9.2 8.6 9.1 8.3 Low  10.1   Albumin 4.0 4.0 3.3 Low  3.6 3.2 Low  4.8   Alkaline Phosphatase 56    33 53   Total Protein 6.0 Low     4.5 Low  6.9   AST 21    21 31   Bilirubin, Total 0.5    1.2 0.9   ALT 17    17 CM 24 CM        omponent  Ref Range & Units (hover) 8 mo ago  (9/16/24) 4 yr ago  (8/5/20) 4 yr ago  (8/4/20) 4 yr ago  (8/3/20) 4 yr ago  (8/3/20) 4 yr ago  (8/2/20)   WBC 7.1 7.2 R 13.6 High  R 17.2 High  R 18.9 High  R 17.2 High  R   Hemoglobin 13.3 Low  12.3 Low  12.9 Low  12.9 Low  11.8 Low  15.2   Hematocrit 41.3 36.9 Low  38.2 Low  38.1 Low  34.7 Low  43.2   MCV 97 95 94 95 93 92   RDW 13.8 13.6 13.7 13.8 13.5 " "13.0   Platelets 307 265 R 274 R 297 R 273 R 377          Component  Ref Range & Units (hover) 8 mo ago   Cholesterol 146   HDL-Cholesterol 58.5   Cholesterol/HDL Ratio 2.5   LDL Calculated 64   VLDL 24   Triglycerides 119   Comment:            Component  Ref Range & Units (hover) 8 mo ago   Vitamin B12 554            Component  Ref Range & Units (hover) 8 mo ago   Vitamin D, 25-Hydroxy, Total 76            Component  Ref Range & Units (hover) 8 mo ago   Thyroid Stimulating Hormone 2.36        MRI brain 5/2021  \"IMPRESSION:     * No evidence of an acute intracranial process or intracranial mass.   *  Moderate generalized volume loss.   *  Hippocampal volumes at the second percentile and whole brain volumes and the 3rd percentile when compared to age matched normal controls by quantitative analysis.   *  Mild white matter disease which is nonspecific but likely reflective of chronic microvascular ischemia.   *  Single focus of remote microhemorrhage in the right centrum semiovale.       Assessment/Plan   1. Major neurocognitive disorder, due to multiple etiologies, without behavioral disturbance, moderate (Multi) (Primary)  Slowly progressive changes in memory and thinking. When evaluated by [facility], it was reported that he was having difficulties for past 3 years. Now, for past 6 years, continues to note mild forgetfulness. Mainly sometimes not recognizing or knowing names of people they have known for awhile. Sometimes repeat himself. They deny major effect on his functioning. He still manages own meds. Still drives. He denies any problems. Wife has driven with him and thinks he does ok. Still able to balance a check book. No problems with BADLs. His MoCA score decreased from 22/30 in 5/2022 to 16/30 today with deficits mainly in orientation (time more than place), phonemic fluency, visuospatial/executive functioning (3/5), and recall (0/5- MIS 5/15 - both retrieval and storage deficits). His MRI " brain in 5/2021 showed significantly low volume in hippocampi relatively to age matched controls suggesting a degenerative process due to alzheimers. However, he did very well on semantic fluency today and poorly with phonemic fluency, with is more consistent with vascular changes. Also he and his wife feel that things are been mostly stable over last few years. He is on donepezil and memantine. Of note, memantine is only fda approved for moderate to severe dementia and has not been shown to provide any benefit in mild dementia. It could be possible that the 2 meds are helping to slow decline but not really clear based on evidence. Suspect mild mixed dementia. Did recommend driving evaluation but he is adamantly against this. Doesn't want to lose his independence. He and his wife are thinking about moving into [place]. Agree with this plan.  - Follow up in Geriatrics - Cognitive Testing; Future    2. Gait abnormality  3. Recurrent falls  Is noted to have somewhat shuffling gait with slightly stooped posture. No rigidity in arms. No other parkinsonian symptoms or signs. He was having multiple falls previously and did PT. Wife notes that since he has been doing exercises regularly, he has not had any further falls. Will monitor  - Follow up in Geriatrics - Cognitive Testing; Future    4. Postural kyphosis, unspecified spinal region  Is noted to be thin and has some kyphosis of the spine. Given history of falls, recommend doing screening bone density     Follow up in 1 year when they return from [place].     Bharati Chavez MD

## 2025-05-20 ENCOUNTER — OFFICE VISIT (OUTPATIENT)
Dept: GERIATRIC MEDICINE | Facility: CLINIC | Age: OVER 89
End: 2025-05-20
Payer: MEDICARE

## 2025-05-20 ENCOUNTER — APPOINTMENT (OUTPATIENT)
Dept: GERIATRIC MEDICINE | Facility: CLINIC | Age: OVER 89
End: 2025-05-20
Payer: MEDICARE

## 2025-05-20 ENCOUNTER — APPOINTMENT (OUTPATIENT)
Dept: PRIMARY CARE | Facility: CLINIC | Age: OVER 89
End: 2025-05-20
Payer: MEDICARE

## 2025-05-20 VITALS
SYSTOLIC BLOOD PRESSURE: 157 MMHG | RESPIRATION RATE: 16 BRPM | TEMPERATURE: 97.2 F | WEIGHT: 150.5 LBS | HEART RATE: 69 BPM | DIASTOLIC BLOOD PRESSURE: 89 MMHG | BODY MASS INDEX: 23.22 KG/M2

## 2025-05-20 DIAGNOSIS — R29.6 RECURRENT FALLS: ICD-10-CM

## 2025-05-20 DIAGNOSIS — G30.1 ALZHEIMER'S DISEASE WITH LATE ONSET (CODE): ICD-10-CM

## 2025-05-20 DIAGNOSIS — F03.B0: Primary | ICD-10-CM

## 2025-05-20 DIAGNOSIS — R26.9 GAIT ABNORMALITY: ICD-10-CM

## 2025-05-20 DIAGNOSIS — M40.00 POSTURAL KYPHOSIS, UNSPECIFIED SPINAL REGION: ICD-10-CM

## 2025-05-20 PROCEDURE — G2212 PROLONG OUTPT/OFFICE VIS: HCPCS | Performed by: INTERNAL MEDICINE

## 2025-05-20 PROCEDURE — 1126F AMNT PAIN NOTED NONE PRSNT: CPT | Performed by: INTERNAL MEDICINE

## 2025-05-20 PROCEDURE — G2211 COMPLEX E/M VISIT ADD ON: HCPCS | Performed by: INTERNAL MEDICINE

## 2025-05-20 PROCEDURE — 3077F SYST BP >= 140 MM HG: CPT | Performed by: INTERNAL MEDICINE

## 2025-05-20 PROCEDURE — 99215 OFFICE O/P EST HI 40 MIN: CPT | Performed by: INTERNAL MEDICINE

## 2025-05-20 PROCEDURE — 3079F DIAST BP 80-89 MM HG: CPT | Performed by: INTERNAL MEDICINE

## 2025-05-20 PROCEDURE — 1170F FXNL STATUS ASSESSED: CPT | Performed by: INTERNAL MEDICINE

## 2025-05-20 ASSESSMENT — GERIATRIC DEPRESSION SCALE SHORT VERSION (GDS-SV)
DO YOU FEEL YOU HAVE MORE PROBLEMS WITH MEMORY THAN MOST: NO
DO YOU FEEL FULL OF ENERGY: NO
DO YOU THINK IT IS WONDERFUL TO BE ALIVE NOW: YES
ARE YOU IN GOOD SPIRITS MOST OF THE TIME: YES
DO YOU PREFER TO STAY AT HOME, RATHER THAN GOING OUT AND DOING NEW THINGS: NO
DO YOU THINK THAT MOST PEOPLE ARE BETTER OFF THAN YOU ARE: NO
HAVE YOU DROPPED MANY OF YOUR ACTIVITIES AND INTERESTS?: NO
DO YOU OFTEN GET BORED: NO
DO YOU FEEL HAPPY MOST OF THE TIME: YES
ARE YOU BASICALLY SATISFIED WITH YOUR LIFE: NO
ARE YOU AFRAID THAT SOMETHING BAD IS GOING TO HAPPEN TO YOU: NO
DO YOU FEEL PRETTY WORTHLESS THE WAY YOU ARE NOW: YES
DO YOU FEEL THAT YOUR SITUATION IS HOPELESS: NO
GDS TOTAL SCORE: 4
DO YOU OFTEN FEEL HELPLESS: YES
DO YOU FEEL THAT YOUR LIFE IS EMPTY: NO

## 2025-05-20 ASSESSMENT — ACTIVITIES OF DAILY LIVING (ADL)
DOING_HOUSEWORK: INDEPENDENT
TAKING_MEDICATION: INDEPENDENT
JUDGMENT_ADEQUATE_SAFELY_COMPLETE_DAILY_ACTIVITIES: YES
USING_TRANSPORTATION: INDEPENDENT
FEEDING YOURSELF: INDEPENDENT
DRESSING YOURSELF: INDEPENDENT
STILL_DRIVING: YES
PREPARING_MEALS: INDEPENDENT
USING_TELEPHONE: INDEPENDENT
ASSISTIVE_DEVICE: EYEGLASSES;HEARING AID - RIGHT;HEARING AID - LEFT
HEARING - RIGHT EAR: HEARING AID
BATHING: INDEPENDENT
WALKS IN HOME: INDEPENDENT
HEARING - LEFT EAR: HEARING AID
TOILETING: INDEPENDENT
PATIENT'S MEMORY ADEQUATE TO SAFELY COMPLETE DAILY ACTIVITIES?: YES
ADEQUATE_TO_COMPLETE_ADL: YES
GROCERY_SHOPPING: INDEPENDENT
PILL_BOX_USED: YES
MANAGING_FINANCES: INDEPENDENT
NEEDS_ASSISTANCE_WITH_FOOD: INDEPENDENT
GROOMING: INDEPENDENT
EATING: INDEPENDENT

## 2025-05-20 ASSESSMENT — ENCOUNTER SYMPTOMS
DEPRESSION: 0
LOSS OF SENSATION IN FEET: 0
OCCASIONAL FEELINGS OF UNSTEADINESS: 1

## 2025-05-20 ASSESSMENT — PATIENT HEALTH QUESTIONNAIRE - PHQ9
SUM OF ALL RESPONSES TO PHQ9 QUESTIONS 1 AND 2: 0
2. FEELING DOWN, DEPRESSED OR HOPELESS: NOT AT ALL
1. LITTLE INTEREST OR PLEASURE IN DOING THINGS: NOT AT ALL

## 2025-05-20 ASSESSMENT — MONTREAL COGNITIVE ASSESSMENT (MOCA)
8. SERIAL SUBTRACTION OF 7S: 3
5. MEMORY TRIALS: 0
WHAT LEVEL OF EDUCATION WAS ATTAINED: 0
VISUOSPATIAL/EXECUTIVE SUBSCORE: 3
11. FOR EACH PAIR OF WORDS, WHAT CATEGORY DO THEY BELONG TO (OUT OF 2): 2
13. ORIENTATION SUBSCORE: 1
4. NAME EACH OF THE THREE ANIMALS SHOWN: 3
12. MEMORY INDEX SCORE: 0
6. READ LIST OF DIGITS [FORWARD/BACKWARD]: 2
7. [VIGILENCE] TAP WHEN HEARING DESIGNATED LETTER: 1
10. [FLUENCY] NAME WORDS STARTING WITH DESIGNATED LETTER: 0
WHAT IS THE TOTAL SCORE (OUT OF 30): 16
9. REPEAT EACH SENTENCE: 1

## 2025-05-20 ASSESSMENT — PAIN SCALES - GENERAL: PAINLEVEL_OUTOF10: 0-NO PAIN

## 2025-05-20 NOTE — PATIENT INSTRUCTIONS
For your memory    A. Increase socialization:  -  going to [employer] during the day; going out with friends, spending time with family, joining clubs  B continue physical exercise:  - try to increase walking   C. Increase mental stimulation:  - brain stimulating activities- crossword puzzles, sudoku, word searches, read books, craft, learn a new hobby, take a class  D. Mediterranean diet- fruits, vegetables, lean meats, and fish, omega 3 fatty fish    2. Agree with looking into [facility]     3. Talk to Dr. Mckay about getting a bone density     4. Follow up when you get back from [place] next year

## 2025-05-20 NOTE — PROGRESS NOTES
"Patient ID: Raul Funez \"Hod\" is a 89 y.o. male who was referred by Dr. Mckay. Has some short and long term memory loss. Forgets names of familiar people.     Identifying Information                              : 1934             Assessment date: 2025    Patient accompanied by: wife Gloria           History provided by: patient and wife    Symptoms Reported (include length of time): several years    CONCERNS IDENTIFIED BY NURSING (Safety Risks, Health Issues, etc)     1. Denies any health ir safety concerns    Safety Assessment    Falls Home Safety Risk Factors  Home Safety Risk Factors: No stair Handrails    Home Safety - Emergency  Does patient know what number to dial in an emergency?: Yes    Safety Concerns  Safety Concerns: Weapons  Safety Concerns Notes:: may have guns that are in gun closet, may have been sold    Daily Functioning Assessment    ADL Screening  Patient's Vision Adequate to Safely Complete Daily Activities: Yes  Patient's Judgment Adequate to Safely Complete Daily Activities: Yes  Patient's Memory Adequate to Safely Complete Daily Activities: Yes  Patient Able to Express Needs/Desires: Yes  Which is your dominant hand?: Left  Dressing: Independent  Grooming: Independent  Feeding: Independent  Bathing: Independent  Toileting: Independent  In/Out Bed: Independent  Walks in Home: Independent  Weakness of Legs: None  Weakness of Arms/Hands: None  Hearing - Right Ear: Hearing aid  Hearing - Left Ear: Hearing aid     Assistive Devices  Assistive Devices: Eyeglasses, Hearing aid - right, Hearing aid - left    IADL's  Using Telephone: Independent  Grocery Shopping: Independent  Preparing Meals: Independent  Doing Housework: Independent  Laundry: Stand by (no railing on stairwell)  Taking Medication: Independent  Pill Box Used: Yes  Managing Finances: Independent  Using Transportation: Independent  Still Driving: Yes  Eating: Independent  Needs Assistance With Food: " Independent  Difficulty Chewing or Swallowing: No          Health Assessment    Nutrition and Exercise  Current Diet: Well Balanced Diet  Adequate Fluid Intake: Yes  Caffeine: Yes (6 cups daily)  Appetite:: Good  Food Consistency:: Regular  Liquids Consistency:: Thin  Changes in Weight?: No  Chewing or Swallowing Problems?: No  Exercise Frequency: Regularly    normal8-10 of uninterrupted sleep

## 2025-05-20 NOTE — PROGRESS NOTES
"Patient ID: Raul Funez \"Hod\" is a 89 y.o. male who presents for cognitive evaluation.    Identifying Information                              : 1934  Assessment date: 2025    Patient accompanied by:  wifeGloria     History provided by: Patient and wife    Symptoms Reported (include length of time): cognitive decline for several years    CONCERNS IDENTIFIED BY SOCIAL WORK (Safety Risks, Health Issues, Advanced Directives not completed, etc)  Advanced directives are not in chart    Social History    Level of Education: MD  Occupation/Work Status: Physician- cardiovascular     halfway date (if applicable): around      On any form of disability? no If so, explain:   Marital Status:                           ENCOUNTER SCREENING RESULTS  MOCA Total Score: 17     Geriatric Depression Scale (Short Version) Total: 4    Advance Directives/Legal/Financial    Person(s) Named on Temple Consent to Release: wifeGloria     Patient Healthcare POA: Gloria Handley            Is Healthcare POA currently scanned into patient's chart: No Wife noted they are currently working with an  to update their legal documents but that she will continue to be the first named POA     DPOA for finances: NA       Legal guardian:  NA     Living Will: yes     Any form of disability? no Type:     Significant financial stressors: none noted    Family History      Biological Mother(status, age at death, cause of death): mid 90s     Biological Father(status, age at death, cause of death): mid 90s    Supportive Relationships (Informal Support)     Spouse/partner information (include length of relationship, health status): Gloria, wife of 17 years, second marriage, first marriage ended in divorce     Children Information (include location, level of engagement):  [place], son in [place], and daughter in out of state, both adopted     Other social supports: none noted       Living situation: with with in home    Formal " Supports     Engaged community services (Emergency Alert, HHA, MOW, Case Management, Etc.): [facility]    Mental Health     Observed Mood: Normal     Observed Affect:calm and pleasant     Current mental health symptoms/diagnosis/treatment (include medications): NA     Relevant Loss/Grief:NA     Relevant mental health history: NA     Self-harm/suicidal thoughts, plan: None Reported     Alcohol, illicit drug, and tobacco use reported by patient/family: one beer daily     Any addiction history: NA         Additional Notes or Follow-up Matters: Review team evaluation results and share information/resources as indicated. Follow-up regarding advanced directives being updated and added to chart.

## 2025-05-26 ENCOUNTER — APPOINTMENT (OUTPATIENT)
Dept: RADIOLOGY | Facility: HOSPITAL | Age: OVER 89
End: 2025-05-26
Payer: MEDICARE

## 2025-05-26 ENCOUNTER — HOSPITAL ENCOUNTER (EMERGENCY)
Facility: HOSPITAL | Age: OVER 89
Discharge: HOME | End: 2025-05-26
Attending: EMERGENCY MEDICINE
Payer: MEDICARE

## 2025-05-26 VITALS
WEIGHT: 145 LBS | HEIGHT: 70 IN | BODY MASS INDEX: 20.76 KG/M2 | HEART RATE: 65 BPM | SYSTOLIC BLOOD PRESSURE: 191 MMHG | DIASTOLIC BLOOD PRESSURE: 93 MMHG | TEMPERATURE: 98.8 F | OXYGEN SATURATION: 99 % | RESPIRATION RATE: 16 BRPM

## 2025-05-26 DIAGNOSIS — R33.9 URINARY RETENTION: Primary | ICD-10-CM

## 2025-05-26 DIAGNOSIS — N32.0 BLADDER OUTLET OBSTRUCTION: ICD-10-CM

## 2025-05-26 DIAGNOSIS — N40.0 BENIGN PROSTATIC HYPERPLASIA, UNSPECIFIED WHETHER LOWER URINARY TRACT SYMPTOMS PRESENT: ICD-10-CM

## 2025-05-26 LAB
ALBUMIN SERPL BCP-MCNC: 4.1 G/DL (ref 3.4–5)
ALP SERPL-CCNC: 76 U/L (ref 33–136)
ALT SERPL W P-5'-P-CCNC: 18 U/L (ref 10–52)
ANION GAP SERPL CALC-SCNC: 12 MMOL/L (ref 10–20)
APPEARANCE UR: CLEAR
AST SERPL W P-5'-P-CCNC: 20 U/L (ref 9–39)
BASOPHILS # BLD AUTO: 0.06 X10*3/UL (ref 0–0.1)
BASOPHILS NFR BLD AUTO: 0.8 %
BILIRUB SERPL-MCNC: 0.6 MG/DL (ref 0–1.2)
BILIRUB UR STRIP.AUTO-MCNC: NEGATIVE MG/DL
BUN SERPL-MCNC: 12 MG/DL (ref 6–23)
CALCIUM SERPL-MCNC: 9.7 MG/DL (ref 8.6–10.6)
CHLORIDE SERPL-SCNC: 102 MMOL/L (ref 98–107)
CO2 SERPL-SCNC: 29 MMOL/L (ref 21–32)
COLOR UR: YELLOW
CREAT SERPL-MCNC: 1 MG/DL (ref 0.5–1.3)
EGFRCR SERPLBLD CKD-EPI 2021: 71 ML/MIN/1.73M*2
EOSINOPHIL # BLD AUTO: 0.29 X10*3/UL (ref 0–0.4)
EOSINOPHIL NFR BLD AUTO: 4 %
ERYTHROCYTE [DISTWIDTH] IN BLOOD BY AUTOMATED COUNT: 13.2 % (ref 11.5–14.5)
GLUCOSE SERPL-MCNC: 94 MG/DL (ref 74–99)
GLUCOSE UR STRIP.AUTO-MCNC: NORMAL MG/DL
HCT VFR BLD AUTO: 40.5 % (ref 41–52)
HGB BLD-MCNC: 13.6 G/DL (ref 13.5–17.5)
IMM GRANULOCYTES # BLD AUTO: 0.02 X10*3/UL (ref 0–0.5)
IMM GRANULOCYTES NFR BLD AUTO: 0.3 % (ref 0–0.9)
KETONES UR STRIP.AUTO-MCNC: NEGATIVE MG/DL
LEUKOCYTE ESTERASE UR QL STRIP.AUTO: NEGATIVE
LYMPHOCYTES # BLD AUTO: 2.5 X10*3/UL (ref 0.8–3)
LYMPHOCYTES NFR BLD AUTO: 34.2 %
MCH RBC QN AUTO: 30.2 PG (ref 26–34)
MCHC RBC AUTO-ENTMCNC: 33.6 G/DL (ref 32–36)
MCV RBC AUTO: 90 FL (ref 80–100)
MONOCYTES # BLD AUTO: 0.71 X10*3/UL (ref 0.05–0.8)
MONOCYTES NFR BLD AUTO: 9.7 %
NEUTROPHILS # BLD AUTO: 3.72 X10*3/UL (ref 1.6–5.5)
NEUTROPHILS NFR BLD AUTO: 51 %
NITRITE UR QL STRIP.AUTO: NEGATIVE
NRBC BLD-RTO: 0 /100 WBCS (ref 0–0)
PH UR STRIP.AUTO: 6.5 [PH]
PLATELET # BLD AUTO: 343 X10*3/UL (ref 150–450)
POTASSIUM SERPL-SCNC: 4.3 MMOL/L (ref 3.5–5.3)
PROT SERPL-MCNC: 6.8 G/DL (ref 6.4–8.2)
PROT UR STRIP.AUTO-MCNC: NEGATIVE MG/DL
Q ONSET: 209 MS
QRS COUNT: 11 BEATS
QRS DURATION: 104 MS
QT INTERVAL: 400 MS
QTC CALCULATION(BAZETT): 422 MS
QTC FREDERICIA: 415 MS
R AXIS: -55 DEGREES
RBC # BLD AUTO: 4.5 X10*6/UL (ref 4.5–5.9)
RBC # UR STRIP.AUTO: NEGATIVE MG/DL
SODIUM SERPL-SCNC: 139 MMOL/L (ref 136–145)
SP GR UR STRIP.AUTO: 1.01
T AXIS: 10 DEGREES
T OFFSET: 409 MS
UROBILINOGEN UR STRIP.AUTO-MCNC: NORMAL MG/DL
VENTRICULAR RATE: 67 BPM
WBC # BLD AUTO: 7.3 X10*3/UL (ref 4.4–11.3)

## 2025-05-26 PROCEDURE — 74177 CT ABD & PELVIS W/CONTRAST: CPT | Performed by: RADIOLOGY

## 2025-05-26 PROCEDURE — 51702 INSERT TEMP BLADDER CATH: CPT

## 2025-05-26 PROCEDURE — 80053 COMPREHEN METABOLIC PANEL: CPT | Performed by: EMERGENCY MEDICINE

## 2025-05-26 PROCEDURE — 74177 CT ABD & PELVIS W/CONTRAST: CPT

## 2025-05-26 PROCEDURE — 99285 EMERGENCY DEPT VISIT HI MDM: CPT | Mod: 25 | Performed by: EMERGENCY MEDICINE

## 2025-05-26 PROCEDURE — 2500000002 HC RX 250 W HCPCS SELF ADMINISTERED DRUGS (ALT 637 FOR MEDICARE OP, ALT 636 FOR OP/ED): Performed by: EMERGENCY MEDICINE

## 2025-05-26 PROCEDURE — 2550000001 HC RX 255 CONTRASTS: Performed by: EMERGENCY MEDICINE

## 2025-05-26 PROCEDURE — 93005 ELECTROCARDIOGRAM TRACING: CPT | Mod: 59

## 2025-05-26 PROCEDURE — 81003 URINALYSIS AUTO W/O SCOPE: CPT | Performed by: EMERGENCY MEDICINE

## 2025-05-26 PROCEDURE — 85025 COMPLETE CBC W/AUTO DIFF WBC: CPT | Performed by: EMERGENCY MEDICINE

## 2025-05-26 PROCEDURE — 36415 COLL VENOUS BLD VENIPUNCTURE: CPT | Performed by: STUDENT IN AN ORGANIZED HEALTH CARE EDUCATION/TRAINING PROGRAM

## 2025-05-26 PROCEDURE — 2500000005 HC RX 250 GENERAL PHARMACY W/O HCPCS: Performed by: EMERGENCY MEDICINE

## 2025-05-26 RX ORDER — LIDOCAINE HYDROCHLORIDE 20 MG/ML
1 JELLY TOPICAL ONCE
Status: COMPLETED | OUTPATIENT
Start: 2025-05-26 | End: 2025-05-26

## 2025-05-26 RX ORDER — TAMSULOSIN HYDROCHLORIDE 0.4 MG/1
0.4 CAPSULE ORAL ONCE
Status: COMPLETED | OUTPATIENT
Start: 2025-05-26 | End: 2025-05-26

## 2025-05-26 RX ORDER — TAMSULOSIN HYDROCHLORIDE 0.4 MG/1
0.4 CAPSULE ORAL DAILY
Qty: 14 CAPSULE | Refills: 0 | Status: SHIPPED | OUTPATIENT
Start: 2025-05-26 | End: 2025-05-28 | Stop reason: WASHOUT

## 2025-05-26 RX ADMIN — IOHEXOL 75 ML: 350 INJECTION, SOLUTION INTRAVENOUS at 16:42

## 2025-05-26 RX ADMIN — TAMSULOSIN HYDROCHLORIDE 0.4 MG: 0.4 CAPSULE ORAL at 17:58

## 2025-05-26 RX ADMIN — LIDOCAINE HYDROCHLORIDE 1 APPLICATION: 20 JELLY TOPICAL at 15:16

## 2025-05-26 ASSESSMENT — COLUMBIA-SUICIDE SEVERITY RATING SCALE - C-SSRS
1. IN THE PAST MONTH, HAVE YOU WISHED YOU WERE DEAD OR WISHED YOU COULD GO TO SLEEP AND NOT WAKE UP?: NO
2. HAVE YOU ACTUALLY HAD ANY THOUGHTS OF KILLING YOURSELF?: NO
6. HAVE YOU EVER DONE ANYTHING, STARTED TO DO ANYTHING, OR PREPARED TO DO ANYTHING TO END YOUR LIFE?: NO

## 2025-05-26 ASSESSMENT — PAIN DESCRIPTION - PAIN TYPE: TYPE: ACUTE PAIN

## 2025-05-26 ASSESSMENT — PAIN SCALES - GENERAL
PAINLEVEL_OUTOF10: 0 - NO PAIN
PAINLEVEL_OUTOF10: 0 - NO PAIN

## 2025-05-26 ASSESSMENT — PAIN - FUNCTIONAL ASSESSMENT
PAIN_FUNCTIONAL_ASSESSMENT: 0-10
PAIN_FUNCTIONAL_ASSESSMENT: 0-10

## 2025-05-26 NOTE — ED TRIAGE NOTES
Pt states he hasn't urinated in 22 hours, pt abd appears slightly distended. Pt states he doesn't feel like he has to urinate. Denies any hx of this before or any prostate issues.

## 2025-05-26 NOTE — DISCHARGE INSTRUCTIONS
Please follow-up as soon as possible with the Urology doctors. A referral was made for Urology follow-up. You can call [phone] to schedule an appointment.

## 2025-05-26 NOTE — PROGRESS NOTES
Emergency Medicine Transition of Care Note.    I received on this patient in signout from Dr. Dr. Porras.  Please see the previous ED provider note for all HPI, PE and MDM up to the time of signout at 4:00 PM. This is in addition to the primary record.    In brief Raul Funez is an 89 y.o. male presenting for   Chief Complaint   Patient presents with    Urinary Retention     At the time of signout we were awaiting: urinalysis and CT abdomen/pelvis.    Diagnoses as of 05/26/25 0099   Urinary retention   Benign prostatic hyperplasia, unspecified whether lower urinary tract symptoms present   Bladder outlet obstruction       Medical Decision Making  Prior to me taking over care of the patient, lozano catheter had been placed with good urine output. CBC showed no leukocytosis Hemoglobin is normal. CMP is unremarkable. Urinalysis showed no evidence of any UTI. CT abdomen/pelvis with IV contrast was obtained and read by radiology as showing prostatomegaly with marked circumferential bladder wall  thickening, suggestive of the chronic bladder outlet obstruction in  the setting of BPH. Patient and his wife were informed of the results. Patient remained stable on repeat evaluation. Patient was given a dose of flomax in the ED and will be written a script for flomax for homegoing. Lozano catheter was kept in place and the patient was instructed to follow-up closely with Urology for further evaluation and management. Referral was made for Urology follow-up. The patient and his wife felt comfortable with the patient being discharged home. The patient was instructed of supportive measures and to follow-up closely with Urology and a primary care physician. Return precautions were provided, for which the patient expressed understanding. The patient was discharged home in stable condition. They should feel free to return to the Emergency Department at any time should their condition worsen or should they have any questions or  concerns.     Final diagnoses:   None       Labs Reviewed   CBC WITH AUTO DIFFERENTIAL - Abnormal       Result Value    WBC 7.3      nRBC 0.0      RBC 4.50      Hemoglobin 13.6      Hematocrit 40.5 (*)     MCV 90      MCH 30.2      MCHC 33.6      RDW 13.2      Platelets 343      Neutrophils % 51.0      Immature Granulocytes %, Automated 0.3      Lymphocytes % 34.2      Monocytes % 9.7      Eosinophils % 4.0      Basophils % 0.8      Neutrophils Absolute 3.72      Immature Granulocytes Absolute, Automated 0.02      Lymphocytes Absolute 2.50      Monocytes Absolute 0.71      Eosinophils Absolute 0.29      Basophils Absolute 0.06     COMPREHENSIVE METABOLIC PANEL - Normal    Glucose 94      Sodium 139      Potassium 4.3      Chloride 102      Bicarbonate 29      Anion Gap 12      Urea Nitrogen 12      Creatinine 1.00      eGFR 71      Calcium 9.7      Albumin 4.1      Alkaline Phosphatase 76      Total Protein 6.8      AST 20      Bilirubin, Total 0.6      ALT 18     URINALYSIS WITH REFLEX CULTURE AND MICROSCOPIC    Narrative:     The following orders were created for panel order Urinalysis with Reflex Culture and Microscopic.  Procedure                               Abnormality         Status                     ---------                               -----------         ------                     Urinalysis with Reflex C...[[id number]]                                                 Extra Urine Gray Tube[[id number]]                                                         Please view results for these tests on the individual orders.   URINALYSIS WITH REFLEX CULTURE AND MICROSCOPIC   EXTRA URINE GRAY TUBE       Point of Care Ultrasound    (Results Pending)   CT abdomen pelvis w IV contrast    (Results Pending)           Procedure  Procedures    Elyssa Reddy MD

## 2025-05-26 NOTE — ED PROVIDER NOTES
HPI   Chief Complaint   Patient presents with    Urinary Retention       The patient is a 89-year-old male presenting with urinary retention.  Patient states that he had been in his normal state of health until this morning when he noted that he had not been able to urinate for approximately 24 hours (last voided yesterday) he did notice sensation of suprapubic discomfort and abdominal fullness.  He did attempt to urinate at home prior to presentation and was unable to initiate any stream.  Does note that he had a bowel movement yesterday and this morning, both of which were normal.  He denies any bowel or bladder incontinence.  He denies any associated back pain.  He denies any numbness or weakness in of his extremities, he denies any previous spine surgeries/injuries, he denies any prior cancer history.  Denies any nausea, vomiting, fevers, chills, denies any dysuria/hematuria prior to noting inability to urinate.  Patient does have a history of BPH but denies any history of urinary retention in the past.              Patient History   Medical History[1]  Surgical History[2]  Family History[3]  Social History[4]    Physical Exam   ED Triage Vitals [05/26/25 1407]   Temperature Heart Rate Respirations BP   37.1 °C (98.8 °F) 65 16 (!) 191/93      Pulse Ox Temp Source Heart Rate Source Patient Position   99 % Tympanic -- --      BP Location FiO2 (%)     -- --       Physical Exam  Vitals and nursing note reviewed.   Constitutional:       General: He is not in acute distress.     Appearance: Normal appearance. He is not ill-appearing or toxic-appearing.   HENT:      Head: Normocephalic and atraumatic.      Nose: No congestion or rhinorrhea.      Mouth/Throat:      Mouth: Mucous membranes are moist.      Pharynx: Oropharynx is clear. No oropharyngeal exudate or posterior oropharyngeal erythema.   Eyes:      Extraocular Movements: Extraocular movements intact.      Right eye: Normal extraocular motion.      Left eye:  Normal extraocular motion.      Conjunctiva/sclera: Conjunctivae normal.      Pupils: Pupils are equal, round, and reactive to light.   Cardiovascular:      Rate and Rhythm: Normal rate and regular rhythm.      Pulses: Normal pulses.      Heart sounds: Normal heart sounds, S1 normal and S2 normal. No murmur heard.     No friction rub. No gallop.   Pulmonary:      Effort: Pulmonary effort is normal. No respiratory distress.      Breath sounds: Normal breath sounds. No stridor. No wheezing, rhonchi or rales.   Abdominal:      General: Abdomen is flat. Bowel sounds are normal. There is no distension.      Palpations: Abdomen is soft.      Tenderness: There is abdominal tenderness in the suprapubic area. There is no right CVA tenderness, left CVA tenderness, guarding or rebound.      Comments: Palpable bladder.  No CVA tenderness.   Musculoskeletal:      Cervical back: Full passive range of motion without pain.      Right lower leg: No edema.      Left lower leg: No edema.   Skin:     General: Skin is warm and dry.   Neurological:      General: No focal deficit present.      Mental Status: He is alert and oriented to person, place, and time.      GCS: GCS eye subscore is 4. GCS verbal subscore is 5. GCS motor subscore is 6.      Cranial Nerves: No cranial nerve deficit.      Sensory: No sensory deficit.      Motor: No weakness, tremor or abnormal muscle tone.      Deep Tendon Reflexes:      Reflex Scores:       Patellar reflexes are 2+ on the right side and 2+ on the left side.     Comments: Intact flexion/extension at knees, ankles bilaterally.  Grossly intact sensation along bilateral upper and lower extremities.  No ankle clonus bilaterally.    Psychiatric:         Mood and Affect: Mood normal.           ED Course & MDM                  No data recorded     [place] Coma Scale Score: 15 (05/26/25 1410 : Negar Foley RN)                           Medical Decision Making  Patient presenting with urinary retention.  On  examination patient does have a palpable bladder and does appear to have a large volume of urine in the bladder on bedside ultrasound, will place urinary catheter for relief.  Will obtain urinalysis to assess for precipitating infectious etiology as well as CMP/cell count to assess for electrolyte abnormality/obstructive uropathy leading to DELLA.  Patient otherwise with a relatively benign abdominal examination and he is nontoxic without CVA tenderness to suggest significant ascending urinary tract infection but will obtain CT abdomen pelvis to assess for acute mechanical abnormality/mass resulting in urinary obstruction.  Low suspicion for primary neurologic (namely spinal cord) cause as patient has noted no change in his ambulatory status, he has no saddle paresthesias and has grossly intact strength/sensation over his bilateral lower extremities, he has had no incontinence since symptom onset, denies any back pain and has no tenderness along T/L-spine.  Patient cell count/CMP unremarkable, urinary catheter was placed with return of 2.5 L of clear urine.  Urinalysis/CT scan pending at time of signout, disposition pending results of above.  Patient signed out to oncoming physician pending imaging/urinalysis results and reassessment, signed out in stable condition.        Procedure  Procedures         [1]   Past Medical History:  Diagnosis Date    Closed fracture of proximal phalanx of little finger 04/14/2021    Encounter for general adult medical examination without abnormal findings 06/05/2015    Preventative health care    Urinary retention 06/10/2024   [2]   Past Surgical History:  Procedure Laterality Date    CATARACT EXTRACTION Bilateral     COLONOSCOPY  02/12/2014    Complete Colonoscopy    LUMBAR LAMINECTOMY      Laminectomy Lumbar    OTHER SURGICAL HISTORY  02/12/2014    Surgery Spermatic Cord Excision Of Hydrocele Left   [3]   Family History  Problem Relation Name Age of Onset    No Known Problems Mother  Fernanda         mid 90s    No Known Problems Father Raul         mid 90s   [4]   Social History  Tobacco Use    Smoking status: Every Day     Types: Cigars    Smokeless tobacco: Current   Substance Use Topics    Alcohol use: Not Currently     Comment: 7 beers per week    Drug use: Not on file        Israel Porras MD  05/26/25 7077

## 2025-05-27 ENCOUNTER — CLINICAL SUPPORT (OUTPATIENT)
Dept: EMERGENCY MEDICINE | Facility: HOSPITAL | Age: OVER 89
End: 2025-05-27
Payer: MEDICARE

## 2025-05-27 ENCOUNTER — TELEPHONE (OUTPATIENT)
Dept: PRIMARY CARE | Facility: CLINIC | Age: OVER 89
End: 2025-05-27
Payer: MEDICARE

## 2025-05-27 LAB — HOLD SPECIMEN: NORMAL

## 2025-05-27 NOTE — PROGRESS NOTES
"  [facility]  Outpatient Clinic Note    Patient Name:  Raul Funez  MRN:  [id number]  :  1934    Referring Provider: Elyssa Reddy MD  Date of Service: 2025   Visit type: New patient visit     problem list/Chief complaint:  BPH with urinary retention - Alfuzosin  ED  Elevated PSA: 5.50 on 21  spermatocele s/p removal ()   Prostatitis      HISTORY OF PRESENT ILLNESS:  Raul Funez \"Hod\" is a 89 y.o. male with past medical history of glaucoma, HTN, HLD, carotid atherosclerosis, vitamin D deficiency, alzheimer's dementia, prostatitis, spermatocele, elevated PSA, ED, BPH, who presents for initial Urology visit. I performed a detailed review of the medical chart records lab testing and imaging. Patient was seen in ER on 25 for urinary retention, lozano catheter placed with 2.5 L of clear urine drained, patient was started on Tamsulosin and discharged with referral to outpatient Urology.  Patient is accompanied by his wife. He has been taking Alfuzosin and Tamsulosin as prescribed. Discussed contraindication of taking both Alfuzosin and Tamsulosin together due to increased risk of side effects and falls, patient to stop taking Tamsulosin and continue with Alfuzosin. TOV pending, patient would like more time to urinate on his own.     I personally reviewed CT AP dated 25.   - Impression -  1.  Prostatomegaly with marked circumferential bladder wall  thickening, suggestive of the chronic bladder outlet obstruction in  the setting of BPH. Superimposed cystitis and/or prostatitis not  excluded in the appropriate clinical setting. Correlate with  urinalysis, PSA levels, and patient symptomatology.  2. Additional findings as above.    PAST MEDICAL HISTORY:  Medical History[1]    PAST SURGICAL HISTORY:  Surgical History[2]    ALLERGIES:  Allergies[3]    MEDICATIONS:  Current Outpatient Medications   Medication Instructions    alfuzosin (UROXATRAL) 10 mg, oral, Daily RT    atorvastatin " (LIPITOR) 40 mg, oral, Daily    cetirizine (ZYRTEC) 10 mg, oral, Daily RT    cholecalciferol (VITAMIN D3) 1,000 Units, oral, Daily    donepezil (ARICEPT) 10 mg, oral, Nightly    doxycycline (ADOXA) 50 mg, oral, Daily    ferrous sulfate 325 mg, oral, Daily RT    latanoprost (Xalatan) 0.005 % ophthalmic solution 1 drop, Both Eyes, Nightly    lisinopril 5 mg, oral, Daily    MAGNESIUM CITRATE ORAL 250 mg, oral, Daily    magnesium oxide (Mag-Ox) 250 mg magnesium tablet Take 0.5 tablets (125 mg) by mouth once daily.    memantine (NAMENDA) 10 mg, oral, 2 times daily    multivitamin tablet 1 tablet, oral, Daily    tamsulosin (FLOMAX) 0.4 mg, oral, Daily, Do not crush, chew, or split.    timolol (Timoptic) 0.5 % ophthalmic solution 1 drop, Both Eyes, 2 times daily    triamterene-hydrochlorothiazid (Maxzide-25) 37.5-25 mg tablet Take 1/2 tablet by mouth daily on Mon, Wed, and Fri        SOCIAL HISTORY:  Social History[4]     FAMILY HISTORY:  Family History[5]     REVIEW OF SYSTEMS:  10-pt ROS reviewed and negative except as mentioned above.    Vital signs:  There were no vitals taken for this visit.    PHYSICAL EXAMINATION:  General: Appears comfortable and in no apparent distress.  Head: Normocephalic, atraumatic  Eyes: Non-injected conjunctiva, sclera clear, no proptosis  Lungs: Breathing is easy, non-labored. Speaking in clear and complete sentences. Normal diaphragmatic movement.  Cardiovascular: no peripheral edema, cyanosis or pallor.   Abdomen: soft, non-distended, non-tender  : Bladder: non tender, not distended  MSK: Ambulatory with steady gait, unassisted  Skin: No visible rashes or lesions  Neurologic: Alert, oriented to person, place, and time  Psychiatric: mood and affect appropriate      IMAGING DATA:   === 05/26/25 ===    CT ABDOMEN PELVIS W IV CONTRAST    - Impression -  1.  Prostatomegaly with marked circumferential bladder wall  thickening, suggestive of the chronic bladder outlet obstruction in  the  setting of BPH. Superimposed cystitis and/or prostatitis not  excluded in the appropriate clinical setting. Correlate with  urinalysis, PSA levels, and patient symptomatology.  2. Additional findings as above.    I personally reviewed the images/study and I agree with the findings  as stated by Radiology resident.    MACRO:  None.    Signed by: Kris Mohan 5/26/2025 5:24 PM  Dictation workstation:   [id number]      LABORATORY DATA:    Lab Results   Component Value Date    WBC 7.3 05/26/2025    HGB 13.6 05/26/2025    HCT 40.5 (L) 05/26/2025    MCV 90 05/26/2025     05/26/2025     Lab Results   Component Value Date    GLUCOSE 94 05/26/2025    CALCIUM 9.7 05/26/2025     05/26/2025    K 4.3 05/26/2025    CO2 29 05/26/2025     05/26/2025    BUN 12 05/26/2025    CREATININE 1.00 05/26/2025     ASSESSMENT:  Raul Funez is a 89 y.o. male with prostatitis, ED, spermatocele s/p removal (2010), elevated PSA, BPH with urinary retention, who presents for initial Urology visit after ER discharge.    Today we discussed BPH. BPH is the benign growth of prostate tissue that may cause bothersome urinary symptoms. The mechanism of action as well as the risks, benefits, common side effects, and alternatives to all prescribed medications were discussed with the patient at length. The patient had the opportunity to ask questions and all questions were answered. I primarily discussed alpha blockers, 5ARIs, and PDE5i.  I explained that 5 alpha reductase inhibitors can shrink the prostate up to 30%, can artificially decrease their PSA value by 50%, but take approximately 6-9 months to reach full efficacy and have potential side effects to include decreased libido, impotence and breast tenderness. Additionally, if you continue to experience bothersome symptoms despite medication, there are minimally invasive procedures to alleviate these symptoms.    PLAN:  - TOV today pending  - ml  - Patient  encouraged to drink plenty of fluids to maintain hydration and clear urine output  - Discussed that they may have some irritative voiding symptoms over the next few days: burning, frequency, urgency  - Activity restrictions reviewed, discussed post op expectations  - Patient instructed to go to ED if unable to void in 4-6 hr or unable to void with urge  -Discussed the risks and benefit of continuing Alfuzosin, medication is working well for patient with no side effects. Discussed other management options. The patient and I agreed to continue with medication.  -Will discontinue Tamsulosin as patient is already taking Alfuzosin  -Follow up in 2 weeks for PVR, or sooner if needed    All questions and concerns were addressed. Patient verbalizes understanding and has no other questions at this time.     E&M visit today is associated with current or anticipated ongoing medical care services related to a patient's single, serious condition or a complex condition.    MORRO Ortiz-CNP  [facility]  5/28/2025 9:26 AM         [1]   Past Medical History:  Diagnosis Date    Closed fracture of proximal phalanx of little finger 04/14/2021    Encounter for general adult medical examination without abnormal findings 06/05/2015    Preventative health care    Urinary retention 06/10/2024   [2]   Past Surgical History:  Procedure Laterality Date    CATARACT EXTRACTION Bilateral     COLONOSCOPY  02/12/2014    Complete Colonoscopy    LUMBAR LAMINECTOMY      Laminectomy Lumbar    OTHER SURGICAL HISTORY  02/12/2014    Surgery Spermatic Cord Excision Of Hydrocele Left   [3]   Allergies  Allergen Reactions    Aspirin Other and Tinnitus   [4]   Social History  Tobacco Use    Smoking status: Every Day     Types: Cigars    Smokeless tobacco: Current   Substance Use Topics    Alcohol use: Not Currently     Comment: 7 beers per week   [5]   Family History  Problem Relation Name Age of Onset    No Known Problems Mother Fernanda          mid 90s    No Known Problems Father Raul         mid 90s

## 2025-05-27 NOTE — TELEPHONE ENCOUNTER
I spoke with patient and his wife, Gloria, regarding ED visit yesterday for urinary retention.  Patient has longstanding BPH.  CT scan with bladder wall thickening suggestive of chronic urinary retention.  Patient states Temple catheter is draining and not uncomfortable at this time.  He has urology follow-up tomorrow for trial of void.  I have recommended that he continue routine follow-up with urology and have sent a message along to urology provider as well.    Abdoulaye Mckay MD

## 2025-05-28 ENCOUNTER — OFFICE VISIT (OUTPATIENT)
Dept: UROLOGY | Facility: HOSPITAL | Age: OVER 89
End: 2025-05-28
Payer: MEDICARE

## 2025-05-28 DIAGNOSIS — R33.8 BENIGN PROSTATIC HYPERPLASIA WITH URINARY RETENTION: Primary | ICD-10-CM

## 2025-05-28 DIAGNOSIS — N40.1 BENIGN PROSTATIC HYPERPLASIA WITH URINARY RETENTION: Primary | ICD-10-CM

## 2025-05-28 PROCEDURE — 1160F RVW MEDS BY RX/DR IN RCRD: CPT | Performed by: NURSE PRACTITIONER

## 2025-05-28 PROCEDURE — 51798 US URINE CAPACITY MEASURE: CPT | Performed by: NURSE PRACTITIONER

## 2025-05-28 PROCEDURE — G2211 COMPLEX E/M VISIT ADD ON: HCPCS | Performed by: NURSE PRACTITIONER

## 2025-05-28 PROCEDURE — 99204 OFFICE O/P NEW MOD 45 MIN: CPT | Performed by: NURSE PRACTITIONER

## 2025-05-28 PROCEDURE — 99214 OFFICE O/P EST MOD 30 MIN: CPT | Mod: 25 | Performed by: NURSE PRACTITIONER

## 2025-05-28 PROCEDURE — 1159F MED LIST DOCD IN RCRD: CPT | Performed by: NURSE PRACTITIONER

## 2025-05-28 NOTE — PROGRESS NOTES
Pt is here today for a trial of void, name and date of birth was verified. Procedure was explained to pt, and understood. Pt tolerated 420cc of sterile saline infused through urinary catheter without difficulty. Urinary catheter was removed and patient voided 0cc. A PVR scan was done and [id number] of urine left in the bladder.   It was explained to pt that if unable to urinate to go to the emergency room. Pt was told and understood to drink plenty of fluids to keep the bladder stimulated. If there were any questions or concerns pt understood --- he is to call the office.

## 2025-05-28 NOTE — PATIENT INSTRUCTIONS
Today we discussed BPH. BPH is the benign growth of prostate tissue that may cause bothersome urinary symptoms. The mechanism of action as well as the risks, benefits, common side effects, and alternatives to all prescribed medications were discussed with the patient at length. The patient had the opportunity to ask questions and all questions were answered. I primarily discussed alpha blockers, 5ARIs, and PDE5i.  I explained that 5 alpha reductase inhibitors can shrink the prostate up to 30%, can artificially decrease their PSA value by 50%, but take approximately 6-9 months to reach full efficacy and have potential side effects to include decreased libido, impotence and breast tenderness. Additionally, if you continue to experience bothersome symptoms despite medication, there are minimally invasive procedures to alleviate these symptoms.    Plan:  - TOV today pending  - Patient encouraged to drink plenty of fluids to maintain hydration and clear urine output  - Discussed that they may have some irritative voiding symptoms over the next few days: burning, frequency, urgency  - Activity restrictions reviewed, discussed post op expectations  - Patient instructed to go to ED if unable to void in 4-6 hr or unable to void with urge  -Follow up in 2 weeks for PVR, or sooner if needed

## 2025-06-08 DIAGNOSIS — N40.1 BENIGN PROSTATIC HYPERPLASIA WITH URINARY FREQUENCY: ICD-10-CM

## 2025-06-08 DIAGNOSIS — R35.0 BENIGN PROSTATIC HYPERPLASIA WITH URINARY FREQUENCY: ICD-10-CM

## 2025-06-08 RX ORDER — ALFUZOSIN HYDROCHLORIDE 10 MG/1
10 TABLET, EXTENDED RELEASE ORAL DAILY
Qty: 90 TABLET | Refills: 3 | Status: SHIPPED | OUTPATIENT
Start: 2025-06-08

## 2025-06-09 NOTE — PROGRESS NOTES
[facility]  Outpatient Clinic Note    Patient Name:  Raul Funez  MRN:  [id number]  :  1934  Date of Service: 6/10/2025     Visit type: Follow up visit    HPI    Interval History:  Raul Funez is a 89 y.o. male with past medical history of glaucoma, HTN, HLD, carotid atherosclerosis, vitamin D deficiency, alzheimer's dementia, prostatitis, spermatocele, elevated PSA, ED, BPH, who is being seen today for  problems listed below.     Problem list/Chief complaints:  BPH with urinary retention - Alfuzosin  ED  Elevated PSA: 5.50 on 21  spermatocele s/p removal ()   Prostatitis      25: NPV. Patient was seen in ER on 25 for urinary retention, lozano catheter placed with 2.5 L of clear urine drained, patient was started on Tamsulosin and discharged with referral to outpatient Urology.  Patient is accompanied by his wife. He has been taking Alfuzosin and Tamsulosin as prescribed. Discussed contraindication of taking both Alfuzosin and Tamsulosin together due to increased risk of side effects and falls, patient to stop taking Tamsulosin and continue with Alfuzosin. TOV pending, patient would like more time to urinate on his own.       Medical History[1]    Surgical History[2]    Social History     Socioeconomic History    Marital status:      Spouse name: Gloria    Number of children: 2    Years of education: 19    Highest education level: Professional school degree (e.g., MD, DDS, DVM, AURY)   Occupational History    Occupation: Physician     Comment: Retired    Tobacco Use    Smoking status: Every Day     Types: Cigars    Smokeless tobacco: Current   Substance and Sexual Activity    Alcohol use: Not Currently     Comment: 7 beers per week    Drug use: Not on file    Sexual activity: Not on file   Other Topics Concern    Not on file   Social History Narrative    Not on file     Social Drivers of Health     Financial Resource Strain: Not on file   Food Insecurity: Not on  file   Transportation Needs: Not on file   Physical Activity: Not on file   Stress: Not on file   Social Connections: Not on file   Intimate Partner Violence: Not on file   Housing Stability: Not on file       Allergies[3]     Current Medications[4]     Review of system:  All other systems have been reviewed and are negative for complaints      Last recorded vitals:  There were no vitals taken for this visit.    Physical Exam:  ***    Imaging  === 05/26/25 ===    CT ABDOMEN PELVIS W IV CONTRAST    - Impression -  1.  Prostatomegaly with marked circumferential bladder wall  thickening, suggestive of the chronic bladder outlet obstruction in  the setting of BPH. Superimposed cystitis and/or prostatitis not  excluded in the appropriate clinical setting. Correlate with  urinalysis, PSA levels, and patient symptomatology.  2. Additional findings as above.    I personally reviewed the images/study and I agree with the findings  as stated by Radiology resident.    MACRO:  None.    Signed by: Kris Mohan 5/26/2025 5:24 PM  Dictation workstation:   [id number]    Labs  Lab Results   Component Value Date    WBC 7.3 05/26/2025    HGB 13.6 05/26/2025    HCT 40.5 (L) 05/26/2025    MCV 90 05/26/2025     05/26/2025     Lab Results   Component Value Date    GLUCOSE 94 05/26/2025    CALCIUM 9.7 05/26/2025     05/26/2025    K 4.3 05/26/2025    CO2 29 05/26/2025     05/26/2025    BUN 12 05/26/2025    CREATININE 1.00 05/26/2025     Assessment and Plan:  Raul Funez is a 89 y.o. male with history of prostatitis, spermatocele, elevated PSA, ED, BPH with urinary retention, who presents for follow up and PVR.     Plan:      Follow-up ***, or sooner if needed, to reassess symptoms and for medication refill.    All questions and concerns were addressed. Patient verbalizes understanding and has no other questions at this time.     Some elements copied from my note on 5/28/25, the elements have been  updated and all reflect current decision making from today, 06/09/25     Maxine Joseph, APRN-CNP   [facility]  06/09/25 4:56 PM       [1]   Past Medical History:  Diagnosis Date    Closed fracture of proximal phalanx of little finger 04/14/2021    Encounter for general adult medical examination without abnormal findings 06/05/2015    Preventative health care    Urinary retention 06/10/2024   [2]   Past Surgical History:  Procedure Laterality Date    CATARACT EXTRACTION Bilateral     COLONOSCOPY  02/12/2014    Complete Colonoscopy    LUMBAR LAMINECTOMY      Laminectomy Lumbar    OTHER SURGICAL HISTORY  02/12/2014    Surgery Spermatic Cord Excision Of Hydrocele Left   [3]   Allergies  Allergen Reactions    Aspirin Other and Tinnitus   [4]   Current Outpatient Medications:     alfuzosin (Uroxatral) 10 mg 24 hr tablet, TAKE 1 TABLET ONCE DAILY, Disp: 90 tablet, Rfl: 3    atorvastatin (Lipitor) 80 mg tablet, Take 0.5 tablets (40 mg) by mouth once daily., Disp: 45 tablet, Rfl: 3    cetirizine (ZyrTEC) 10 mg tablet, Take 1 tablet (10 mg) by mouth once daily., Disp: , Rfl:     cholecalciferol (Vitamin D3) 25 MCG (1000 UT) tablet, Take 1 tablet (1,000 Units) by mouth once daily., Disp: , Rfl:     donepezil (Aricept) 10 mg tablet, Take 1 tablet (10 mg) by mouth once daily at bedtime., Disp: 90 tablet, Rfl: 3    doxycycline (Adoxa) 100 mg tablet, Take 0.5 tablets (50 mg) by mouth once daily., Disp: 45 tablet, Rfl: 3    ferrous sulfate, 325 mg ferrous sulfate, tablet, Take 1 tablet (325 mg) by mouth once daily., Disp: , Rfl:     latanoprost (Xalatan) 0.005 % ophthalmic solution, Administer 1 drop into both eyes once daily at bedtime., Disp: , Rfl:     lisinopril 5 mg tablet, Take 1 tablet (5 mg) by mouth once daily., Disp: , Rfl:     MAGNESIUM CITRATE ORAL, Take 250 mg by mouth once daily., Disp: , Rfl:     magnesium oxide (Mag-Ox) 250 mg magnesium tablet, Take 0.5 tablets (125 mg) by mouth once daily., Disp: , Rfl:      memantine (Namenda) 10 mg tablet, Take 1 tablet (10 mg) by mouth twice a day., Disp: , Rfl:     multivitamin tablet, Take 1 tablet by mouth once daily., Disp: , Rfl:     timolol (Timoptic) 0.5 % ophthalmic solution, Administer 1 drop into both eyes twice a day., Disp: , Rfl:     triamterene-hydrochlorothiazid (Maxzide-25) 37.5-25 mg tablet, Take 1/2 tablet by mouth daily on Mon, Wed, and Fri, Disp: , Rfl:

## 2025-06-10 ENCOUNTER — APPOINTMENT (OUTPATIENT)
Dept: UROLOGY | Facility: HOSPITAL | Age: OVER 89
End: 2025-06-10
Payer: MEDICARE

## 2025-06-17 DIAGNOSIS — F02.B0 MODERATE LATE ONSET ALZHEIMER'S DEMENTIA WITHOUT BEHAVIORAL DISTURBANCE, PSYCHOTIC DISTURBANCE, MOOD DISTURBANCE, OR ANXIETY (MULTI): ICD-10-CM

## 2025-06-17 DIAGNOSIS — G30.1 MODERATE LATE ONSET ALZHEIMER'S DEMENTIA WITHOUT BEHAVIORAL DISTURBANCE, PSYCHOTIC DISTURBANCE, MOOD DISTURBANCE, OR ANXIETY (MULTI): ICD-10-CM

## 2025-06-17 RX ORDER — DONEPEZIL HYDROCHLORIDE 10 MG/1
10 TABLET, FILM COATED ORAL NIGHTLY
Qty: 90 TABLET | Refills: 3 | Status: SHIPPED | OUTPATIENT
Start: 2025-06-17

## 2025-06-22 ENCOUNTER — HOSPITAL ENCOUNTER (EMERGENCY)
Facility: HOSPITAL | Age: OVER 89
Discharge: HOME | End: 2025-06-22
Attending: EMERGENCY MEDICINE
Payer: MEDICARE

## 2025-06-22 VITALS
TEMPERATURE: 98.2 F | HEART RATE: 75 BPM | HEIGHT: 70 IN | SYSTOLIC BLOOD PRESSURE: 147 MMHG | BODY MASS INDEX: 20.04 KG/M2 | RESPIRATION RATE: 16 BRPM | DIASTOLIC BLOOD PRESSURE: 73 MMHG | OXYGEN SATURATION: 97 % | WEIGHT: 140 LBS

## 2025-06-22 DIAGNOSIS — N40.0 BENIGN PROSTATIC HYPERPLASIA, UNSPECIFIED WHETHER LOWER URINARY TRACT SYMPTOMS PRESENT: ICD-10-CM

## 2025-06-22 DIAGNOSIS — R33.9 URINARY RETENTION: Primary | ICD-10-CM

## 2025-06-22 LAB
APPEARANCE UR: ABNORMAL
BILIRUB UR STRIP.AUTO-MCNC: NEGATIVE MG/DL
COLOR UR: ABNORMAL
GLUCOSE UR STRIP.AUTO-MCNC: NORMAL MG/DL
KETONES UR STRIP.AUTO-MCNC: NEGATIVE MG/DL
LEUKOCYTE ESTERASE UR QL STRIP.AUTO: ABNORMAL
MUCOUS THREADS #/AREA URNS AUTO: ABNORMAL /LPF
NITRITE UR QL STRIP.AUTO: NEGATIVE
PH UR STRIP.AUTO: 7 [PH]
PROT UR STRIP.AUTO-MCNC: ABNORMAL MG/DL
RBC # UR STRIP.AUTO: ABNORMAL MG/DL
RBC #/AREA URNS AUTO: ABNORMAL /HPF
SP GR UR STRIP.AUTO: 1.02
UROBILINOGEN UR STRIP.AUTO-MCNC: NORMAL MG/DL
WBC #/AREA URNS AUTO: >50 /HPF
WBC CLUMPS #/AREA URNS AUTO: ABNORMAL /HPF

## 2025-06-22 PROCEDURE — 99284 EMERGENCY DEPT VISIT MOD MDM: CPT | Mod: 25

## 2025-06-22 PROCEDURE — 81001 URINALYSIS AUTO W/SCOPE: CPT

## 2025-06-22 PROCEDURE — 99283 EMERGENCY DEPT VISIT LOW MDM: CPT | Performed by: EMERGENCY MEDICINE

## 2025-06-22 PROCEDURE — 99285 EMERGENCY DEPT VISIT HI MDM: CPT | Performed by: EMERGENCY MEDICINE

## 2025-06-22 PROCEDURE — 51798 US URINE CAPACITY MEASURE: CPT

## 2025-06-22 PROCEDURE — 51702 INSERT TEMP BLADDER CATH: CPT

## 2025-06-22 PROCEDURE — 87086 URINE CULTURE/COLONY COUNT: CPT

## 2025-06-22 RX ORDER — TAMSULOSIN HYDROCHLORIDE 0.4 MG/1
0.4 CAPSULE ORAL DAILY
Qty: 30 CAPSULE | Refills: 0 | Status: SHIPPED | OUTPATIENT
Start: 2025-06-22 | End: 2025-06-30 | Stop reason: ALTCHOICE

## 2025-06-22 ASSESSMENT — PAIN SCALES - GENERAL
PAINLEVEL_OUTOF10: 5 - MODERATE PAIN
PAINLEVEL_OUTOF10: 0 - NO PAIN

## 2025-06-22 ASSESSMENT — PAIN DESCRIPTION - LOCATION: LOCATION: ABDOMEN

## 2025-06-22 ASSESSMENT — PAIN - FUNCTIONAL ASSESSMENT: PAIN_FUNCTIONAL_ASSESSMENT: 0-10

## 2025-06-22 NOTE — ED PROVIDER NOTES
HPI   Chief Complaint   Patient presents with    Urinary Retention       HPI    89-year-old male with history of BPH complicated by chronic urinary retention and Alzheimer’s dementia presents with inability to urinate. The patient is a poor historian, so history was obtained from his wife at bedside. She reports that since the afternoon of 6/21 the patient has had difficulty urinating, which progressed to complete inability to void by the following morning, prompting ED evaluation. He has also had constipation for 3 days. She denies any fever, chills, or flank pain. Notably, the patient was seen on 5/26 for urinary retention, where a Temple catheter was placed due to chronic bladder outlet obstruction from prostatic enlargement. PVR at that time was 458 cc, and urology follow-up was arranged. He was started on Alfuzosin with plans to re-evaluate in 2 weeks. However, he did not continue follow-up and is not currently taking any BPH medications.    Patient History   Medical History[1]  Surgical History[2]  Family History[3]  Social History[4]    Physical Exam   ED Triage Vitals [06/22/25 0625]   Temperature Heart Rate Respirations BP   36.8 °C (98.2 °F) 84 16 163/81      Pulse Ox Temp Source Heart Rate Source Patient Position   96 % Temporal Monitor --      BP Location FiO2 (%)     -- --       Physical Exam  Cardiovascular:      Rate and Rhythm: Normal rate and regular rhythm.      Heart sounds: No murmur heard.  Pulmonary:      Effort: No respiratory distress.   Abdominal:      Comments: Visible bladder distension with tenderness to palpation   Neurological:      Mental Status: He is alert. Mental status is at baseline.   Psychiatric:         Mood and Affect: Mood normal.       ED Course & MDM   Diagnoses as of 06/22/25 1129   Urinary retention   Benign prostatic hyperplasia, unspecified whether lower urinary tract symptoms present         No data recorded     [place] Coma Scale Score: 15 (06/22/25 0628 : Ashley  DANTE Deal)                   Medical Decision Making  89-year-old male with history of BPH presenting with chronic urinary retention likely in the setting of BPH.  Patient recurrent retention now with bladder scan greater than 2300 cc, history of prior retention requiring Temple, and poor outpatient medication adherence and follow-up.  UA shows unremarkable.  Patient bladder distention improved s/p Temple catheter placement. Discussed case with urology by phone who recommend starting alpha-blocker and Temple to remain in place until outpatient reevaluation.  Flomax sent to patient's [facility] pharmacy and reinforced adherence with patient and wife.  Patient to pick medication up after discharge.  Referral placed to urology for continued follow-up.      Man Irwin DO  Family Medicine PGY-3       [1]   Past Medical History:  Diagnosis Date    Closed fracture of proximal phalanx of little finger 04/14/2021    Encounter for general adult medical examination without abnormal findings 06/05/2015    [facility] care    Urinary retention 06/10/2024   [2]   Past Surgical History:  Procedure Laterality Date    CATARACT EXTRACTION Bilateral     COLONOSCOPY  02/12/2014    Complete Colonoscopy    LUMBAR LAMINECTOMY      Laminectomy Lumbar    OTHER SURGICAL HISTORY  02/12/2014    Surgery Spermatic Cord Excision Of Hydrocele Left   [3]   Family History  Problem Relation Name Age of Onset    No Known Problems Mother Fernanda         mid 90s    No Known Problems Father Raul         mid 90s   [4]   Social History  Tobacco Use    Smoking status: Every Day     Types: Cigars    Smokeless tobacco: Current   Substance Use Topics    Alcohol use: Not Currently     Comment: 7 beers per week    Drug use: Not on file        Man Irwin DO  Resident  06/22/25 3581

## 2025-06-22 NOTE — ED TRIAGE NOTES
Pt states that he can not urinate or have a bowel movement. Pt states that he has not urinated since yesterday afternoon and he has not had a bowel movement in two days.

## 2025-06-23 LAB
BACTERIA UR CULT: ABNORMAL
HOLD SPECIMEN: NORMAL

## 2025-06-24 ENCOUNTER — LAB (OUTPATIENT)
Dept: LAB | Facility: HOSPITAL | Age: OVER 89
End: 2025-06-24
Payer: MEDICARE

## 2025-06-24 ENCOUNTER — TELEPHONE (OUTPATIENT)
Dept: PHARMACY | Facility: HOSPITAL | Age: OVER 89
End: 2025-06-24
Payer: MEDICARE

## 2025-06-24 DIAGNOSIS — Z00.00 ENCOUNTER FOR GENERAL ADULT MEDICAL EXAMINATION WITHOUT ABNORMAL FINDINGS: Primary | ICD-10-CM

## 2025-06-24 LAB
25(OH)D3 SERPL-MCNC: 71 NG/ML (ref 30–100)
ALBUMIN SERPL BCP-MCNC: 3.7 G/DL (ref 3.4–5)
ALP SERPL-CCNC: 55 U/L (ref 33–136)
ALT SERPL W P-5'-P-CCNC: 12 U/L (ref 10–52)
ANION GAP SERPL CALC-SCNC: 13 MMOL/L (ref 10–20)
APPEARANCE UR: ABNORMAL
AST SERPL W P-5'-P-CCNC: 15 U/L (ref 9–39)
BACTERIA #/AREA URNS AUTO: ABNORMAL /HPF
BASOPHILS # BLD AUTO: 0.05 X10*3/UL (ref 0–0.1)
BASOPHILS NFR BLD AUTO: 0.5 %
BILIRUB SERPL-MCNC: 0.8 MG/DL (ref 0–1.2)
BILIRUB UR STRIP.AUTO-MCNC: NEGATIVE MG/DL
BUN SERPL-MCNC: 23 MG/DL (ref 6–23)
CALCIUM SERPL-MCNC: 9.1 MG/DL (ref 8.6–10.6)
CHLORIDE SERPL-SCNC: 102 MMOL/L (ref 98–107)
CHOLEST SERPL-MCNC: 147 MG/DL (ref 0–199)
CHOLESTEROL/HDL RATIO: 3.1
CO2 SERPL-SCNC: 27 MMOL/L (ref 21–32)
COLOR UR: YELLOW
CREAT SERPL-MCNC: 1.14 MG/DL (ref 0.5–1.3)
CRP SERPL HS-MCNC: >80 MG/L
EGFRCR SERPLBLD CKD-EPI 2021: 61 ML/MIN/1.73M*2
EOSINOPHIL # BLD AUTO: 0.22 X10*3/UL (ref 0–0.4)
EOSINOPHIL NFR BLD AUTO: 2 %
ERYTHROCYTE [DISTWIDTH] IN BLOOD BY AUTOMATED COUNT: 13.2 % (ref 11.5–14.5)
EST. AVERAGE GLUCOSE BLD GHB EST-MCNC: 105 MG/DL
GLUCOSE SERPL-MCNC: 98 MG/DL (ref 74–99)
GLUCOSE UR STRIP.AUTO-MCNC: NORMAL MG/DL
HBA1C MFR BLD: 5.3 % (ref ?–5.7)
HCT VFR BLD AUTO: 38.6 % (ref 41–52)
HDLC SERPL-MCNC: 47.7 MG/DL
HGB BLD-MCNC: 11.7 G/DL (ref 13.5–17.5)
IMM GRANULOCYTES # BLD AUTO: 0.07 X10*3/UL (ref 0–0.5)
IMM GRANULOCYTES NFR BLD AUTO: 0.6 % (ref 0–0.9)
KETONES UR STRIP.AUTO-MCNC: NEGATIVE MG/DL
LDLC SERPL CALC-MCNC: 81 MG/DL
LEUKOCYTE ESTERASE UR QL STRIP.AUTO: ABNORMAL
LYMPHOCYTES # BLD AUTO: 1.87 X10*3/UL (ref 0.8–3)
LYMPHOCYTES NFR BLD AUTO: 17 %
MCH RBC QN AUTO: 29.6 PG (ref 26–34)
MCHC RBC AUTO-ENTMCNC: 30.3 G/DL (ref 32–36)
MCV RBC AUTO: 98 FL (ref 80–100)
MONOCYTES # BLD AUTO: 1.33 X10*3/UL (ref 0.05–0.8)
MONOCYTES NFR BLD AUTO: 12.1 %
MUCOUS THREADS #/AREA URNS AUTO: ABNORMAL /LPF
NEUTROPHILS # BLD AUTO: 7.45 X10*3/UL (ref 1.6–5.5)
NEUTROPHILS NFR BLD AUTO: 67.8 %
NITRITE UR QL STRIP.AUTO: NEGATIVE
NON HDL CHOLESTEROL: 99 MG/DL (ref 0–149)
NRBC BLD-RTO: 0 /100 WBCS (ref 0–0)
PH UR STRIP.AUTO: 5.5 [PH]
PLATELET # BLD AUTO: 394 X10*3/UL (ref 150–450)
POTASSIUM SERPL-SCNC: 4.3 MMOL/L (ref 3.5–5.3)
PROT SERPL-MCNC: 5.8 G/DL (ref 6.4–8.2)
PROT UR STRIP.AUTO-MCNC: ABNORMAL MG/DL
RBC # BLD AUTO: 3.95 X10*6/UL (ref 4.5–5.9)
RBC # UR STRIP.AUTO: ABNORMAL MG/DL
RBC #/AREA URNS AUTO: >20 /HPF
SODIUM SERPL-SCNC: 138 MMOL/L (ref 136–145)
SP GR UR STRIP.AUTO: 1.02
TRIGL SERPL-MCNC: 92 MG/DL (ref 0–149)
TSH SERPL-ACNC: 2.3 MIU/L (ref 0.44–3.98)
UROBILINOGEN UR STRIP.AUTO-MCNC: NORMAL MG/DL
VLDL: 18 MG/DL (ref 0–40)
WBC # BLD AUTO: 11 X10*3/UL (ref 4.4–11.3)
WBC #/AREA URNS AUTO: ABNORMAL /HPF

## 2025-06-24 PROCEDURE — 85025 COMPLETE CBC W/AUTO DIFF WBC: CPT

## 2025-06-24 PROCEDURE — 81001 URINALYSIS AUTO W/SCOPE: CPT

## 2025-06-24 PROCEDURE — 84443 ASSAY THYROID STIM HORMONE: CPT

## 2025-06-24 PROCEDURE — 82306 VITAMIN D 25 HYDROXY: CPT

## 2025-06-24 PROCEDURE — 80061 LIPID PANEL: CPT

## 2025-06-24 PROCEDURE — 80053 COMPREHEN METABOLIC PANEL: CPT

## 2025-06-24 PROCEDURE — 83036 HEMOGLOBIN GLYCOSYLATED A1C: CPT

## 2025-06-24 PROCEDURE — 86141 C-REACTIVE PROTEIN HS: CPT

## 2025-06-24 NOTE — PROGRESS NOTES
"EDPD Note: Rapid Result Review    I reviewed Raul Funez \"Hod\" 's chart regarding a positive urine culture/result that was taken during their recent emergency room visit. The patient was not told about their finalized results prior to leaving the emergency department. Therefore, patient was contacted and given appropriate education. Patient presented to ED with chronic urinary retention likely in the setting of BPH. Patient bladder distention improved s/p Temple catheter placement. Sposue states patient is not having any urinary complaints. No obstruction and urine is of normal color. Since asymptomatic UTI, patient is not indicated for antibiotic at this time. Advised patient to follow up with PCP or urgent care if urinary symptoms due arise.     No further follow up needed from EDPD Team.     Susceptibility data from last 90 days.  Collected Specimen Info Organism   06/22/25 Urine from Indwelling (Temple) Catheter Coagulase negative staphylococcus     Admission on 06/22/2025, Discharged on 06/22/2025   Component Date Value Ref Range Status    Color, Urine 06/22/2025 Light-Orange (N)  Light-Yellow, Yellow, Dark-Yellow Final    Appearance, Urine 06/22/2025 Turbid (N)  Clear Final    Specific Gravity, Urine 06/22/2025 1.018  1.005 - 1.035 Final    pH, Urine 06/22/2025 7.0  5.0, 5.5, 6.0, 6.5, 7.0, 7.5, 8.0 Final    Protein, Urine 06/22/2025 30 (1+) (A)  NEGATIVE, 10 (TRACE), 20 (TRACE) mg/dL Final    Glucose, Urine 06/22/2025 Normal  Normal mg/dL Final    Blood, Urine 06/22/2025 0.06 (1+) (A)  NEGATIVE mg/dL Final    Ketones, Urine 06/22/2025 NEGATIVE  NEGATIVE mg/dL Final    Bilirubin, Urine 06/22/2025 NEGATIVE  NEGATIVE mg/dL Final    Urobilinogen, Urine 06/22/2025 Normal  Normal mg/dL Final    Nitrite, Urine 06/22/2025 NEGATIVE  NEGATIVE Final    Leukocyte Esterase, Urine 06/22/2025 500 Candy/uL (A)  NEGATIVE Final    WBC, Urine 06/22/2025 >50 (A)  1-5, NONE /HPF Final    WBC Clumps, Urine 06/22/2025 OCCASIONAL  " Reference range not established. /HPF Final    RBC, Urine 06/22/2025 11-20 (A)  NONE, 1-2, 3-5 /HPF Final    Mucus, Urine 06/22/2025 2+  Reference range not established. /LPF Final    Urine Culture 06/22/2025 >=100,000 CFU/mL Coagulase negative staphylococcus (A)   Final    Routine susceptibility testing is not performed.       If there are any other questions for the ED Post-Discharge Culture Follow Up Team, please contact [phone]. Fax: [phone].    Wendi AsifD

## 2025-06-25 LAB — HOLD SPECIMEN: NORMAL

## 2025-06-27 ENCOUNTER — TELEPHONE (OUTPATIENT)
Dept: PRIMARY CARE | Facility: CLINIC | Age: OVER 89
End: 2025-06-27
Payer: MEDICARE

## 2025-06-27 NOTE — TELEPHONE ENCOUNTER
I spoke with patient and his wife.  He has Temple catheter in place for acute urinary retention, seen in the emergency department on 6/22.  He is doing well.  Urine is clear.  He is having no discomfort.  He has his physical scheduled with me in 3 days on 6/20.  He has urology follow-up with Dr. Ashford on 7/1.    I have advised him to bring his medication bottles to appointment.    Abdoulaye Mckay MD

## 2025-06-30 ENCOUNTER — APPOINTMENT (OUTPATIENT)
Dept: PRIMARY CARE | Facility: CLINIC | Age: OVER 89
End: 2025-06-30
Payer: MEDICARE

## 2025-06-30 VITALS
DIASTOLIC BLOOD PRESSURE: 62 MMHG | SYSTOLIC BLOOD PRESSURE: 120 MMHG | HEIGHT: 69 IN | OXYGEN SATURATION: 97 % | HEART RATE: 85 BPM | BODY MASS INDEX: 21.03 KG/M2 | WEIGHT: 142 LBS

## 2025-06-30 DIAGNOSIS — E78.2 MIXED HYPERLIPIDEMIA: ICD-10-CM

## 2025-06-30 DIAGNOSIS — H90.3 SENSORINEURAL HEARING LOSS (SNHL) OF BOTH EARS: ICD-10-CM

## 2025-06-30 DIAGNOSIS — J30.89 NON-SEASONAL ALLERGIC RHINITIS, UNSPECIFIED TRIGGER: ICD-10-CM

## 2025-06-30 DIAGNOSIS — R26.9 GAIT ABNORMALITY: ICD-10-CM

## 2025-06-30 DIAGNOSIS — Z00.00 WELLNESS EXAMINATION: ICD-10-CM

## 2025-06-30 DIAGNOSIS — R33.8 BENIGN PROSTATIC HYPERPLASIA WITH URINARY RETENTION: ICD-10-CM

## 2025-06-30 DIAGNOSIS — F02.B0 MODERATE LATE ONSET ALZHEIMER'S DEMENTIA WITHOUT BEHAVIORAL DISTURBANCE, PSYCHOTIC DISTURBANCE, MOOD DISTURBANCE, OR ANXIETY (MULTI): ICD-10-CM

## 2025-06-30 DIAGNOSIS — N40.1 BENIGN PROSTATIC HYPERPLASIA WITH URINARY RETENTION: ICD-10-CM

## 2025-06-30 DIAGNOSIS — I10 PRIMARY HYPERTENSION: ICD-10-CM

## 2025-06-30 DIAGNOSIS — H40.1131 PRIMARY OPEN ANGLE GLAUCOMA (POAG) OF BOTH EYES, MILD STAGE: ICD-10-CM

## 2025-06-30 DIAGNOSIS — L57.0 ACTINIC KERATOSES: ICD-10-CM

## 2025-06-30 DIAGNOSIS — Z00.00 MEDICARE ANNUAL WELLNESS VISIT, SUBSEQUENT: Primary | ICD-10-CM

## 2025-06-30 DIAGNOSIS — G30.1 MODERATE LATE ONSET ALZHEIMER'S DEMENTIA WITHOUT BEHAVIORAL DISTURBANCE, PSYCHOTIC DISTURBANCE, MOOD DISTURBANCE, OR ANXIETY (MULTI): ICD-10-CM

## 2025-06-30 DIAGNOSIS — E55.9 VITAMIN D DEFICIENCY: ICD-10-CM

## 2025-06-30 PROBLEM — H92.02 EARACHE ON LEFT: Status: RESOLVED | Noted: 2024-06-10 | Resolved: 2025-06-30

## 2025-06-30 PROCEDURE — G2211 COMPLEX E/M VISIT ADD ON: HCPCS | Performed by: INTERNAL MEDICINE

## 2025-06-30 PROCEDURE — G0439 PPPS, SUBSEQ VISIT: HCPCS | Performed by: INTERNAL MEDICINE

## 2025-06-30 PROCEDURE — 3078F DIAST BP <80 MM HG: CPT | Performed by: INTERNAL MEDICINE

## 2025-06-30 PROCEDURE — 3074F SYST BP LT 130 MM HG: CPT | Performed by: INTERNAL MEDICINE

## 2025-06-30 PROCEDURE — 1159F MED LIST DOCD IN RCRD: CPT | Performed by: INTERNAL MEDICINE

## 2025-06-30 PROCEDURE — UHSPHYS PR UH SELECT PHYSICAL: Performed by: INTERNAL MEDICINE

## 2025-06-30 RX ORDER — FEXOFENADINE HCL 180 MG/1
180 TABLET ORAL DAILY
COMMUNITY

## 2025-06-30 NOTE — PROGRESS NOTES
Raul Funez is a 89 y.o. year old here for a Medicare Annual Wellness Visit     Health Risk Assessment  In general, health is:  Good     Concerns with balance:  Yes     Concerns with teeth or dentures:  No     Concerns with sexual function:  No     Felt anxious, stressed, angry, irritable, lonely, isolated, or had thoughts of hurting themself:  No     Has little interest or pleasure in doing things:  No     Bothered by feeling down, depressed, or hopeless:  No     Needs help with grocery shopping, cooking, housework, bathing, grooming, dressing, eating, sitting or standing, walking, using the toilet, handling finances, taking medications, using the telephone, or driving:  Yes     Following safety precautions in the home environment and vehicle: removed throw rugs from floors, installed grab bars in the bathroom, handrails in stairwells, having adequate lighting, wearing seatbelt at all times?:  Yes     Smokes cigarettes, vapes, or chew tobacco:  Cigar daily     Eats healthy foods including fruits, vegetables, whole grains, and fiber-rich foods:  Yes     Number of days per week engages in exercise:  7 days per week     Average alcohol consumption: 7 beers per week        Current Providers  Specialists: I have reviewed specialist-related care of the patient in the medical record.     Medical/Family history review  Reviewed and updated problem list, medical/surgical/family/social history, medications, and allergies.     Opioid use review  Patient use of opioids:  No           Depression screening  Depression Screening PHQ-2 Score   2/14/2023 0         Depression screening tool completed and reviewed. Based on score and interview, patient is not at risk for depression. Screening tool discussed with patient, and I recommended no further intervention at this time.     Cognitive screening  Mini Cog Score:  1/5 (dementia on treatment)     Cognitive screening reviewed and plan:  No     Functional Observation  Was the  "patient's timed Up & Go test unsteady or >= 12 seconds?  Yes     Advance Care Planning  End of Life planning discussed, including patient's advanced directive wishes:  Yes    Vision and Hearing assessment  Visual acuity (required for Welcome to Medicare):  ophthalmology  Hearing Evaluation:  Hearing loss    ====================================================    /62 (BP Location: Left arm, Patient Position: Sitting, BP Cuff Size: Adult)   Pulse 85   Ht 1.753 m (5' 9\")   Wt 64.4 kg (142 lb)   SpO2 97%   BMI 20.97 kg/m²     Chief Complaint   Patient presents with    Annual Exam       Wellness exam/physical ([facility])    Primary hypertension  BP is at goal today.    Hyperlipidemia  Lipid panel is at goal on atorvastatin and healthy TLC    BPH (benign prostatic hypertrophy) with urinary retention  Two recent bouts of urine retention, requiring catheter placement and failed TOV.  Use of alfuzosin 10 mg daily helpful.  Appt with  tomorrow.    Dementia, Alzheimer's type  Dual therapy: donepezil 10 mg daily and memantine 10 mg twice a day  He does not engage in spontaneous conversation during visit today-One word replies.  Recent consult with Dr. Chavez.  No changes made.  DXA scan and driving eval recommended, declined per patient  He, wife and I discussed driving.  Wife does not drive with patient to observe.  I recommended driving evaluation, but he is declining.  Info sheet for driving eval given to his wife.     Gait disorder  No falls, no ambulatory device need today.    Vitamin D deficiency  On vitamin D 1000 IU daily     Allergic rhinitis   Allegra 180 mg is effective.    Bilateral hearing loss  Hearing aid use, no issues.     Bilateral glaucoma  Care from ophthalmology, Dr. Orta.     Actinic keratoses  Rosacea  Routine follow-up with Dr. Sánchez          Review of Systems   Constitutional: Negative for malaise/fatigue.   HENT:  Negative for hearing loss and sore throat.    Eyes:  Negative for " blurred vision and visual disturbance.   Cardiovascular:  Negative for chest pain, dyspnea on exertion and palpitations.   Respiratory:  Negative for cough, shortness of breath and wheezing.    Skin:  Negative for rash.   Musculoskeletal:  Negative for back pain, joint pain and muscle cramps.   Gastrointestinal:  Negative for abdominal pain, constipation, diarrhea and heartburn.   Genitourinary:  Negative for dysuria, frequency and urgency.   Neurological:  Positive for loss of balance. Negative for dizziness, headaches, light-headedness and numbness.   Psychiatric/Behavioral:  Negative for depression. The patient is not nervous/anxious.         Physical Exam  Vitals reviewed.   Constitutional:       Appearance: Normal appearance.   HENT:      Head: Normocephalic.      Right Ear: Tympanic membrane normal.      Left Ear: Tympanic membrane normal.      Mouth/Throat:      Mouth: Mucous membranes are moist.      Pharynx: No oropharyngeal exudate.   Eyes:      Conjunctiva/sclera: Conjunctivae normal.   Neck:      Vascular: No carotid bruit.   Cardiovascular:      Rate and Rhythm: Normal rate and regular rhythm.      Heart sounds: No murmur heard.  Pulmonary:      Effort: Pulmonary effort is normal.      Breath sounds: Normal breath sounds. No wheezing or rales.   Abdominal:      General: Bowel sounds are normal.      Palpations: Abdomen is soft.      Tenderness: There is no abdominal tenderness.   Musculoskeletal:      Right lower leg: No edema.      Left lower leg: No edema.      Comments: Bilateral hips and knees with HF.   Lymphadenopathy:      Cervical: No cervical adenopathy.   Skin:     General: Skin is warm.   Neurological:      General: No focal deficit present.      Mental Status: He is alert.      Comments: Normal motor in all 4 ext.  Shuffling gait   Psychiatric:         Mood and Affect: Mood normal.       Assessment and Plan    Medicare annual wellness visit (subsequent)  Continued exercising daily is  recommended  Well-balanced diet rich in fruits, vegetables, fiber, lean protein recommended  Annual influenza vaccine recommended in September/October  Continue routine follow-up with ophthalmology, dentistry, and dermatology recommended  Providing copy of advance directives (DURABLE POWER OF  for healthcare) to place in chart recommended.    =============================    Wellness exam/physical ( [facility])  Continued exercising daily is recommended  Well-balanced diet rich in fruits, vegetables, fiber, lean protein recommended  Annual influenza vaccine recommended in September/October  Continue routine follow-up with ophthalmology, dentistry, and dermatology recommended  Providing copy of advance directives (DURABLE POWER OF  for healthcare) to place in chart recommended.    Primary hypertension  Continue current medication regimen with lisinopril 5 mg daily and triamterene/hydrochlorothiazide, 1/2 tablet 3 days a week on Monday, Wednesday, Friday     Hyperlipidemia  Continue atorvastatin 40 mg daily  Maintain proper low-fat/cholesterol, high-fiber diet     BPH (benign prostatic hypertrophy) with urinary retention, Temple catheter in place  Continue alfuzosin 10 mg daily  Appointment scheduled with urology, Dr. Ashford, tomorrow     Dementia, Alzheimer's type  Continue donepezil 10 mg daily and memantine 10 mg twice a day  Recommendation for driving evaluation given.  I have spoken with the patient as well as his wife and given options for evaluation.  Continue annual follow-up with Dr. Chavez     Gait disorder  Continue fall precautions are recommended  Use of ambulatory device such as cane or walker/rollator is recommended     Vitamin D deficiency  Continue vitamin D 1000 IU daily     Allergic rhinitis  Continue Allegra 180 mg daily as needed    Bilateral hearing loss  Continue hearing aid use     Bilateral glaucoma  Continue current glaucoma eyedrops  Continue routine follow-up with  ophthalmology, Dr. Orta.     Actinic keratoses  Rosacea  Continue routine routine follow-up with Dr. Sánchez     Follow-up  1.  Office visit in September  (Labwork ordered to complete prior to visit)  2.  Wellness exam/Medicare annual wellness visit in 1 year    Abdoulaye Mckay MD

## 2025-06-30 NOTE — PATIENT INSTRUCTIONS
Wellness exam/physical ([facility])  Continued exercising daily is recommended  Well-balanced diet rich in fruits, vegetables, fiber, lean protein recommended  Annual influenza vaccine recommended in September/October  Continue routine follow-up with ophthalmology, dentistry, and dermatology recommended  Providing copy of advance directives (DURABLE POWER OF  for healthcare) to place in chart recommended.    Primary hypertension  Continue current medication regimen with lisinopril 5 mg daily and triamterene/hydrochlorothiazide, 1/2 tablet 3 days a week on Monday, Wednesday, Friday     Hyperlipidemia  Continue atorvastatin 40 mg daily  Maintain proper low-fat/cholesterol, high-fiber diet     BPH (benign prostatic hypertrophy) with urinary retention, Temple catheter in place  Continue alfuzosin 10 mg daily  Appointment scheduled with urology, Dr. Ashford, tomorrow     Dementia, Alzheimer's type  Continue donepezil 10 mg daily and memantine 10 mg twice a day  Recommendation for driving evaluation given.  I have spoken with the patient as well as his wife and given options for evaluation.  Continue annual follow-up with Dr. Chavez     Gait disorder  Continue fall precautions are recommended  Use of ambulatory device such as cane or walker/rollator is recommended     Vitamin D deficiency  Continue vitamin D 1000 IU daily     Allergic rhinitis  Continue Allegra 180 mg daily as needed    Bilateral hearing loss  Continue hearing aid use     Bilateral glaucoma  Continue current glaucoma eyedrops  Continue routine follow-up with ophthalmology, Dr. Orta.     Actinic keratoses  Rosacea  Continue routine routine follow-up with Dr. Sánchze     Follow-up  1.  Office visit in September  (Labwork ordered to complete prior to visit)  2.  Wellness exam/Medicare annual wellness visit in 1 year

## 2025-07-01 ENCOUNTER — APPOINTMENT (OUTPATIENT)
Dept: UROLOGY | Facility: CLINIC | Age: OVER 89
End: 2025-07-01
Payer: MEDICARE

## 2025-07-01 DIAGNOSIS — R33.8 BENIGN PROSTATIC HYPERPLASIA WITH URINARY RETENTION: Primary | ICD-10-CM

## 2025-07-01 DIAGNOSIS — N40.1 BENIGN PROSTATIC HYPERPLASIA WITH URINARY RETENTION: Primary | ICD-10-CM

## 2025-07-01 PROCEDURE — 99215 OFFICE O/P EST HI 40 MIN: CPT | Performed by: UROLOGY

## 2025-07-01 PROCEDURE — G2211 COMPLEX E/M VISIT ADD ON: HCPCS | Performed by: UROLOGY

## 2025-07-01 PROCEDURE — 1159F MED LIST DOCD IN RCRD: CPT | Performed by: UROLOGY

## 2025-07-01 RX ORDER — LISINOPRIL 5 MG/1
5 TABLET ORAL DAILY
Qty: 90 TABLET | Refills: 3 | Status: SHIPPED | OUTPATIENT
Start: 2025-07-01

## 2025-07-01 NOTE — PROGRESS NOTES
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 He presents today with his wife who is his primary historian. Patient has recurrent urinary retention despite maximal medical therapy. He reports prior to this episode he had no bothersome urinary symptoms. Patient has a Temple catheter placed. Denies any recent gross hematuria, fevers, chills, intractable flank or abdominal pain, nausea or vomiting.        Medical History[1]  Surgical History[2]  Family History[3]  Current Medications[4]  Allergies[5]  Social History     Socioeconomic History    Marital status:      Spouse name: Gloria    Number of children: 2    Years of education: 19    Highest education level: Professional school degree (e.g., MD, DDS, DVM, AURY)   Occupational History    Occupation: Physician     Comment: Retired 2010   Tobacco Use    Smoking status: Every Day     Types: Cigars    Smokeless tobacco: Current   Substance and Sexual Activity    Alcohol use: Not Currently     Comment: 7 beers per week    Drug use: Not on file    Sexual activity: Not on file   Other Topics Concern    Not on file   Social History Narrative    Not on file     Social Drivers of Health     Financial Resource Strain: Not on file   Food Insecurity: Not on file   Transportation Needs: Not on file   Physical Activity: Not on file   Stress: Not on file   Social Connections: Not on file   Intimate Partner Violence: Not on file   Housing Stability: Not on file       Review of Systems  Pertinent items are noted in HPI.    Objective       Lab Review  Lab Results   Component Value Date    WBC 11.0 06/24/2025    RBC 3.95 (L) 06/24/2025    HGB 11.7 (L) 06/24/2025    HCT 38.6 (L) 06/24/2025     06/24/2025      Lab Results   Component Value Date    BUN 23 06/24/2025    CREATININE 1.14 06/24/2025            Assessment/Plan   Diagnoses and all orders for this visit:  Urinary retention  -      Referral to Urology  Benign prostatic hyperplasia, unspecified whether lower urinary tract symptoms present  -     Referral to Urology    BPH with LUTS + urinary retention     Estimated prostate volume from CT is 110 g, I measured personally.     We discussed surgical treatment options including HoLEP vs. PAE, risks discussed.     We will refer patient to Dr. Jimenez for a PAE consult.     All questions were answered to the patient's satisfaction. Patient agrees with the plan and wishes to proceed. Follow-up will be scheduled appropriately.     I spent 40 minutes of dedicated E&M time, including preparation and review of records, notes, and data, time spent with patient/family, and documentation.     E&M visit today is associated with current or anticipated ongoing medical care services related to a patient's single, serious condition or a complex condition.    Scribed for Dr. Ashford by Albania Carr. I , Dr Ashford, have personally reviewed and agreed with the information entered by the Virtual Scribe.          [1]   Past Medical History:  Diagnosis Date    Closed fracture of proximal phalanx of little finger 04/14/2021    Encounter for general adult medical examination without abnormal findings 06/05/2015    Preventative health care    Urinary retention 06/10/2024   [2]   Past Surgical History:  Procedure Laterality Date    CATARACT EXTRACTION Bilateral     COLONOSCOPY  02/12/2014    Complete Colonoscopy    LUMBAR LAMINECTOMY      Laminectomy Lumbar    OTHER SURGICAL HISTORY  02/12/2014    Surgery Spermatic Cord Excision Of Hydrocele Left   [3]   Family History  Problem Relation Name Age of Onset    No Known Problems Mother Fernanda         mid 90s    No Known Problems Father Raul         mid 90s    Breast cancer Sister Breanna    [4]   Current Outpatient Medications   Medication Sig Dispense Refill    alfuzosin (Uroxatral) 10 mg 24 hr tablet TAKE 1 TABLET ONCE DAILY 90 tablet 3    atorvastatin (Lipitor) 80 mg tablet Take  0.5 tablets (40 mg) by mouth once daily. 45 tablet 3    cholecalciferol (Vitamin D3) 25 MCG (1000 UT) tablet Take 1 tablet (1,000 Units) by mouth once daily.      donepezil (Aricept) 10 mg tablet TAKE 1 TABLET ONCE DAILY ATBEDTIME 90 tablet 3    doxycycline (Adoxa) 100 mg tablet Take 0.5 tablets (50 mg) by mouth once daily. 45 tablet 3    ferrous sulfate, 325 mg ferrous sulfate, tablet Take 1 tablet by mouth once daily.      fexofenadine (Allegra) 180 mg tablet Take 1 tablet (180 mg) by mouth once daily.      latanoprost (Xalatan) 0.005 % ophthalmic solution Administer 1 drop into both eyes once daily at bedtime.      lisinopril 5 mg tablet Take 1 tablet (5 mg) by mouth once daily.      magnesium oxide (Mag-Ox) 250 mg magnesium tablet Take 1 tablet (250 mg) by mouth once daily.      multivitamin tablet Take 1 tablet by mouth once daily.      timolol (Timoptic) 0.5 % ophthalmic solution Administer 1 drop into both eyes twice a day.      triamterene-hydrochlorothiazid (Maxzide-25) 37.5-25 mg tablet Take 1/2 tablet by mouth daily on Mon, Wed, and Fri      MAGNESIUM CITRATE ORAL Take 250 mg by mouth once daily. (Patient not taking: Reported on 7/1/2025)      memantine (Namenda) 10 mg tablet Take 1 tablet (10 mg) by mouth twice a day.       No current facility-administered medications for this visit.   [5]   Allergies  Allergen Reactions    Aspirin Other and Tinnitus

## 2025-07-02 ASSESSMENT — ENCOUNTER SYMPTOMS
PALPITATIONS: 0
BLURRED VISION: 0
MUSCLE CRAMPS: 0
BACK PAIN: 0
ABDOMINAL PAIN: 0
DEPRESSION: 0
LIGHT-HEADEDNESS: 0
SORE THROAT: 0
DYSPNEA ON EXERTION: 0
DYSURIA: 0
WHEEZING: 0
CONSTIPATION: 0
DIARRHEA: 0
HEADACHES: 0
FREQUENCY: 0
NUMBNESS: 0
DIZZINESS: 0
LOSS OF BALANCE: 1
COUGH: 0
NERVOUS/ANXIOUS: 0
HEARTBURN: 0
SHORTNESS OF BREATH: 0

## 2025-07-15 ENCOUNTER — HOSPITAL ENCOUNTER (OUTPATIENT)
Dept: RADIOLOGY | Facility: HOSPITAL | Age: OVER 89
Discharge: HOME | End: 2025-07-15
Payer: MEDICARE

## 2025-07-15 DIAGNOSIS — R33.8 BENIGN PROSTATIC HYPERPLASIA WITH URINARY RETENTION: ICD-10-CM

## 2025-07-15 DIAGNOSIS — R79.1 COAGULATION TEST ABNORMALITY: Primary | ICD-10-CM

## 2025-07-15 DIAGNOSIS — N40.1 BENIGN PROSTATIC HYPERPLASIA WITH URINARY RETENTION: ICD-10-CM

## 2025-07-15 PROCEDURE — 99204 OFFICE O/P NEW MOD 45 MIN: CPT | Performed by: PHYSICIAN ASSISTANT

## 2025-07-15 ASSESSMENT — ENCOUNTER SYMPTOMS
RESPIRATORY NEGATIVE: 1
CARDIOVASCULAR NEGATIVE: 1
PSYCHIATRIC NEGATIVE: 1
FATIGUE: 1
NEUROLOGICAL NEGATIVE: 1
GASTROINTESTINAL NEGATIVE: 1

## 2025-07-15 NOTE — CONSULTS
INTERVENTIONAL RADIOLOGY OUTPATIENT CONSULTATION  [facility]    Subjective  Raul Funez, 89 y.o. male is a patent presenting with: BPH with urinary retention    HPI  Mr. Parks is a 89 yo male with PMH HTN, HLD, BPH with urinary retention, Alzheimer's dementia, who presents today for evaluation for prostate artery embolization.  Patient was referred by urologist, Dr. Amairani Ashford.  Patient is accompanied today by his wife, Gloria.    Patient with episode of urinary retention requiring emergency room visit 5/26/25, lozano catheter was placed at that time.  Patient established with urology and had trial of void 5/28/25.  Patient returned to emergency room again with urinary retention on 6/22/25, lozano catheter was again placed, and this catheter is still in place today.  IPSS = 0 due to presence of lozano catheter, however when asked regarding symptoms preceding recent urinary retention episodes, both patient and wife denied symptoms and IPSS = 0.  Of note, patient scored QOL = 5, feeling unhappy if he had to spend the rest of his life with urinary condition the way it is now.  Patient is eager for lozano catheter to be removed.    Interventional Radiology has been consulted for: prostate artery embolization    Review of Systems   Constitutional:  Positive for fatigue.   Respiratory: Negative.     Cardiovascular: Negative.    Gastrointestinal: Negative.    Genitourinary: Negative.    Neurological: Negative.    Psychiatric/Behavioral: Negative.     All other systems reviewed and are negative.      Medical History[1]  Surgical History[2]  Social History     Socioeconomic History    Marital status:      Spouse name: Gloria    Number of children: 2    Years of education: 19    Highest education level: Professional school degree (e.g., MD, DDS, DVM, AURY)   Occupational History    Occupation: Physician     Comment: Retired 2010   Tobacco Use    Smoking status: Every Day     Types: Cigars    Smokeless  tobacco: Current   Substance and Sexual Activity    Alcohol use: Not Currently     Comment: 7 beers per week    Drug use: Not on file    Sexual activity: Not on file   Other Topics Concern    Not on file   Social History Narrative    Not on file     Social Drivers of Health     Financial Resource Strain: Not on file   Food Insecurity: Not on file   Transportation Needs: Not on file   Physical Activity: Not on file   Stress: Not on file   Social Connections: Not on file   Intimate Partner Violence: Not on file   Housing Stability: Not on file     Family History[3]  Allergies[4]  Medications Ordered Prior to Encounter[5]    Objective    There were no vitals filed for this visit.    Physical Exam  Constitutional:       General: He is not in acute distress.     Appearance: He is normal weight.      Comments: Cooperative, sitting up in chair  Accompanied by wife   HENT:      Head: Normocephalic.      Mouth/Throat:      Mouth: Mucous membranes are moist.   Eyes:      Conjunctiva/sclera: Conjunctivae normal.   Pulmonary:      Effort: Pulmonary effort is normal. No respiratory distress.   Genitourinary:     Comments: Temple catheter with leg bag  Musculoskeletal:      Right lower leg: No edema.      Left lower leg: No edema.      Comments: Slow gait with short steps, hunched posture   Skin:     General: Skin is warm and dry.   Neurological:      General: No focal deficit present.      Mental Status: He is alert and oriented to person, place, and time. Mental status is at baseline.   Psychiatric:         Mood and Affect: Mood normal.         Behavior: Behavior normal.         Pertinent Imaging  CT ABDOMEN PELVIS W IV CONTRAST; 5/26/2025 4:42 pm   IMPRESSION:  1.  Prostatomegaly with marked circumferential bladder wall  thickening, suggestive of the chronic bladder outlet obstruction in  the setting of BPH. Superimposed cystitis and/or prostatitis not  excluded in the appropriate clinical setting. Correlate with  urinalysis,  PSA levels, and patient symptomatology.  2. Additional findings as above.    Assessment & Plan   Computed MELD 3.0 unavailable. One or more values for this score either were not found within the given timeframe or did not fit some other criterion.  Computed MELD-Na unavailable. One or more values for this score either were not found within the given timeframe or did not fit some other criterion.      [facility]- Ambulatory and  capable of all selfcare; unable to carry out work activities.  Up and about > 50% of waking hrs.    Review of noted imaging reveals enlarged prostate    Current plan:   -patient and available imaging d/w Dr. Jimenez  -d/w patient procedure, risks/benefits, post-procedure expectations  -will plan for PAE with procedural sedation, tentatively scheduled for 25  -will plan to exchange lozano catheter during procedure  -anticipate lozano remaining in place 3-4 weeks post-procedure with TOV at urology clinic - patient aware of timeline for lozano to remain in place    I personally spent 60 minutes on this consult with over 50% of time spent discussing management and care of specified diagnosis with patient and/or coordinating care for this patient.       Kendra Guzman PA-C     Vascular and Interventional Radiology  [facility]  [phone] Nursing  [phone] Scheduling             [1]   Past Medical History:  Diagnosis Date    Closed fracture of proximal phalanx of little finger 2021    Encounter for general adult medical examination without abnormal findings 2015    Preventative health care    Urinary retention 06/10/2024   [2]   Past Surgical History:  Procedure Laterality Date    CATARACT EXTRACTION Bilateral     COLONOSCOPY  2014    Complete Colonoscopy    LUMBAR LAMINECTOMY      Laminectomy Lumbar    OTHER SURGICAL HISTORY  2014    Surgery Spermatic Cord Excision Of Hydrocele Left   [3]   Family History  Problem Relation Name Age of  Onset    No Known Problems Mother Fernanda         mid 90s    No Known Problems Father Raul         mid 90s    Breast cancer Sister Breanna    [4]   Allergies  Allergen Reactions    Aspirin Other and Tinnitus   [5]   Current Outpatient Medications on File Prior to Encounter   Medication Sig Dispense Refill    alfuzosin (Uroxatral) 10 mg 24 hr tablet TAKE 1 TABLET ONCE DAILY 90 tablet 3    atorvastatin (Lipitor) 80 mg tablet Take 0.5 tablets (40 mg) by mouth once daily. 45 tablet 3    cholecalciferol (Vitamin D3) 25 MCG (1000 UT) tablet Take 1 tablet (1,000 Units) by mouth once daily.      donepezil (Aricept) 10 mg tablet TAKE 1 TABLET ONCE DAILY ATBEDTIME 90 tablet 3    doxycycline (Adoxa) 100 mg tablet Take 0.5 tablets (50 mg) by mouth once daily. 45 tablet 3    ferrous sulfate, 325 mg ferrous sulfate, tablet Take 1 tablet by mouth once daily.      fexofenadine (Allegra) 180 mg tablet Take 1 tablet (180 mg) by mouth once daily.      latanoprost (Xalatan) 0.005 % ophthalmic solution Administer 1 drop into both eyes once daily at bedtime.      lisinopril 5 mg tablet Take 1 tablet (5 mg) by mouth once daily. 90 tablet 3    MAGNESIUM CITRATE ORAL Take 250 mg by mouth once daily. (Patient not taking: Reported on 7/1/2025)      magnesium oxide (Mag-Ox) 250 mg magnesium tablet Take 1 tablet (250 mg) by mouth once daily.      memantine (Namenda) 10 mg tablet Take 1 tablet (10 mg) by mouth twice a day.      multivitamin tablet Take 1 tablet by mouth once daily.      timolol (Timoptic) 0.5 % ophthalmic solution Administer 1 drop into both eyes twice a day.      triamterene-hydrochlorothiazid (Maxzide-25) 37.5-25 mg tablet Take 1/2 tablet by mouth daily on Mon, Wed, and Fri       No current facility-administered medications on file prior to encounter.

## 2025-07-28 ENCOUNTER — LAB (OUTPATIENT)
Dept: LAB | Facility: HOSPITAL | Age: OVER 89
End: 2025-07-28
Payer: MEDICARE

## 2025-07-28 DIAGNOSIS — R79.1 ABNORMAL COAGULATION PROFILE: Primary | ICD-10-CM

## 2025-07-28 LAB
ALBUMIN SERPL BCP-MCNC: 3.8 G/DL (ref 3.4–5)
ALP SERPL-CCNC: 66 U/L (ref 33–136)
ALT SERPL W P-5'-P-CCNC: 13 U/L (ref 10–52)
ANION GAP SERPL CALC-SCNC: 13 MMOL/L (ref 10–20)
AST SERPL W P-5'-P-CCNC: 15 U/L (ref 9–39)
BILIRUB SERPL-MCNC: 0.6 MG/DL (ref 0–1.2)
BUN SERPL-MCNC: 23 MG/DL (ref 6–23)
CALCIUM SERPL-MCNC: 9.4 MG/DL (ref 8.6–10.6)
CHLORIDE SERPL-SCNC: 104 MMOL/L (ref 98–107)
CO2 SERPL-SCNC: 29 MMOL/L (ref 21–32)
CREAT SERPL-MCNC: 1.23 MG/DL (ref 0.5–1.3)
EGFRCR SERPLBLD CKD-EPI 2021: 55 ML/MIN/1.73M*2
ERYTHROCYTE [DISTWIDTH] IN BLOOD BY AUTOMATED COUNT: 13.4 % (ref 11.5–14.5)
GLUCOSE SERPL-MCNC: 99 MG/DL (ref 74–99)
HCT VFR BLD AUTO: 41.6 % (ref 41–52)
HGB BLD-MCNC: 12.8 G/DL (ref 13.5–17.5)
INR PPP: 1 (ref 0.9–1.1)
MCH RBC QN AUTO: 29.8 PG (ref 26–34)
MCHC RBC AUTO-ENTMCNC: 30.8 G/DL (ref 32–36)
MCV RBC AUTO: 97 FL (ref 80–100)
NRBC BLD-RTO: 0 /100 WBCS (ref 0–0)
PLATELET # BLD AUTO: 328 X10*3/UL (ref 150–450)
POTASSIUM SERPL-SCNC: 4 MMOL/L (ref 3.5–5.3)
PROT SERPL-MCNC: 5.9 G/DL (ref 6.4–8.2)
PROTHROMBIN TIME: 11.1 SECONDS (ref 9.8–12.4)
RBC # BLD AUTO: 4.29 X10*6/UL (ref 4.5–5.9)
SODIUM SERPL-SCNC: 142 MMOL/L (ref 136–145)
WBC # BLD AUTO: 7.5 X10*3/UL (ref 4.4–11.3)

## 2025-07-28 PROCEDURE — 80053 COMPREHEN METABOLIC PANEL: CPT

## 2025-07-28 PROCEDURE — 85027 COMPLETE CBC AUTOMATED: CPT

## 2025-07-28 PROCEDURE — 36415 COLL VENOUS BLD VENIPUNCTURE: CPT

## 2025-07-28 PROCEDURE — 85610 PROTHROMBIN TIME: CPT

## 2025-07-30 ENCOUNTER — HOSPITAL ENCOUNTER (OUTPATIENT)
Dept: RADIOLOGY | Facility: HOSPITAL | Age: OVER 89
Discharge: HOME | End: 2025-07-30
Payer: MEDICARE

## 2025-07-30 VITALS
RESPIRATION RATE: 18 BRPM | TEMPERATURE: 98.4 F | SYSTOLIC BLOOD PRESSURE: 142 MMHG | OXYGEN SATURATION: 98 % | DIASTOLIC BLOOD PRESSURE: 66 MMHG | HEART RATE: 51 BPM

## 2025-07-30 DIAGNOSIS — R33.8 BENIGN PROSTATIC HYPERPLASIA WITH URINARY RETENTION: ICD-10-CM

## 2025-07-30 DIAGNOSIS — N40.1 BENIGN PROSTATIC HYPERPLASIA WITH URINARY RETENTION: ICD-10-CM

## 2025-07-30 PROCEDURE — C1769 GUIDE WIRE: HCPCS

## 2025-07-30 PROCEDURE — 7100000009 HC PHASE TWO TIME - INITIAL BASE CHARGE

## 2025-07-30 PROCEDURE — 99153 MOD SED SAME PHYS/QHP EA: CPT

## 2025-07-30 PROCEDURE — 37244 VASC EMBOLIZE/OCCLUDE BLEED: CPT | Performed by: RADIOLOGY

## 2025-07-30 PROCEDURE — C1887 CATHETER, GUIDING: HCPCS

## 2025-07-30 PROCEDURE — 2720000007 HC OR 272 NO HCPCS

## 2025-07-30 PROCEDURE — 2550000001 HC RX 255 CONTRASTS: Performed by: RADIOLOGY

## 2025-07-30 PROCEDURE — 99152 MOD SED SAME PHYS/QHP 5/>YRS: CPT | Performed by: RADIOLOGY

## 2025-07-30 PROCEDURE — 99152 MOD SED SAME PHYS/QHP 5/>YRS: CPT

## 2025-07-30 PROCEDURE — 37253 INTRVASC US NONCORONARY ADDL: CPT | Performed by: RADIOLOGY

## 2025-07-30 PROCEDURE — 2780000003 HC OR 278 NO HCPCS

## 2025-07-30 PROCEDURE — 2500000004 HC RX 250 GENERAL PHARMACY W/ HCPCS (ALT 636 FOR OP/ED)

## 2025-07-30 PROCEDURE — C1760 CLOSURE DEV, VASC: HCPCS

## 2025-07-30 PROCEDURE — 75736 ARTERY X-RAYS PELVIS: CPT | Performed by: RADIOLOGY

## 2025-07-30 PROCEDURE — 2500000004 HC RX 250 GENERAL PHARMACY W/ HCPCS (ALT 636 FOR OP/ED): Performed by: RADIOLOGY

## 2025-07-30 PROCEDURE — 36248 INS CATH ABD/L-EXT ART ADDL: CPT | Performed by: RADIOLOGY

## 2025-07-30 PROCEDURE — C1894 INTRO/SHEATH, NON-LASER: HCPCS

## 2025-07-30 PROCEDURE — 96373 THER/PROPH/DIAG INJ IA: CPT | Performed by: RADIOLOGY

## 2025-07-30 PROCEDURE — 7100000010 HC PHASE TWO TIME - EACH INCREMENTAL 1 MINUTE

## 2025-07-30 RX ORDER — OXYCODONE HYDROCHLORIDE 5 MG/1
5 TABLET ORAL EVERY 6 HOURS PRN
Qty: 5 TABLET | Refills: 0 | Status: SHIPPED | OUTPATIENT
Start: 2025-07-30 | End: 2025-07-30

## 2025-07-30 RX ORDER — ONDANSETRON 4 MG/1
4 TABLET, FILM COATED ORAL EVERY 8 HOURS PRN
Qty: 9 TABLET | Refills: 0 | Status: SHIPPED | OUTPATIENT
Start: 2025-07-30 | End: 2025-07-30

## 2025-07-30 RX ORDER — MIDAZOLAM HYDROCHLORIDE 2 MG/2ML
INJECTION, SOLUTION INTRAMUSCULAR; INTRAVENOUS
Status: COMPLETED | OUTPATIENT
Start: 2025-07-30 | End: 2025-07-30

## 2025-07-30 RX ORDER — FENTANYL CITRATE 50 UG/ML
INJECTION, SOLUTION INTRAMUSCULAR; INTRAVENOUS
Status: COMPLETED | OUTPATIENT
Start: 2025-07-30 | End: 2025-07-30

## 2025-07-30 RX ORDER — ONDANSETRON 4 MG/1
4 TABLET, FILM COATED ORAL EVERY 8 HOURS PRN
Qty: 9 TABLET | Refills: 0 | Status: SHIPPED | OUTPATIENT
Start: 2025-07-30

## 2025-07-30 RX ORDER — CEFTRIAXONE 2 G/50ML
2 INJECTION, SOLUTION INTRAVENOUS ONCE
Status: COMPLETED | OUTPATIENT
Start: 2025-07-30 | End: 2025-07-30

## 2025-07-30 RX ORDER — NITROGLYCERIN 20 MG/100ML
INJECTION INTRAVENOUS CONTINUOUS PRN
Status: COMPLETED | OUTPATIENT
Start: 2025-07-30 | End: 2025-07-30

## 2025-07-30 RX ORDER — CEPHALEXIN 500 MG/1
500 CAPSULE ORAL 2 TIMES DAILY
Qty: 10 CAPSULE | Refills: 0 | Status: SHIPPED | OUTPATIENT
Start: 2025-07-30 | End: 2025-07-30

## 2025-07-30 RX ORDER — OXYCODONE HYDROCHLORIDE 5 MG/1
5 TABLET ORAL EVERY 6 HOURS PRN
Qty: 5 TABLET | Refills: 0 | Status: SHIPPED | OUTPATIENT
Start: 2025-07-30

## 2025-07-30 RX ORDER — CEPHALEXIN 500 MG/1
500 CAPSULE ORAL 2 TIMES DAILY
Qty: 10 CAPSULE | Refills: 0 | Status: SHIPPED | OUTPATIENT
Start: 2025-07-30

## 2025-07-30 RX ADMIN — FENTANYL CITRATE 25 MCG: 50 INJECTION, SOLUTION INTRAMUSCULAR; INTRAVENOUS at 10:00

## 2025-07-30 RX ADMIN — FENTANYL CITRATE 25 MCG: 50 INJECTION, SOLUTION INTRAMUSCULAR; INTRAVENOUS at 11:00

## 2025-07-30 RX ADMIN — NITROGLYCERIN 100 MCG: 20 INJECTION INTRAVENOUS at 11:55

## 2025-07-30 RX ADMIN — MIDAZOLAM HYDROCHLORIDE 0.5 MG: 2 INJECTION, SOLUTION INTRAMUSCULAR; INTRAVENOUS at 10:00

## 2025-07-30 RX ADMIN — MIDAZOLAM HYDROCHLORIDE 0.5 MG: 2 INJECTION, SOLUTION INTRAMUSCULAR; INTRAVENOUS at 11:00

## 2025-07-30 RX ADMIN — NITROGLYCERIN 150 MCG: 20 INJECTION INTRAVENOUS at 10:47

## 2025-07-30 RX ADMIN — IOHEXOL 100 ML: 350 INJECTION, SOLUTION INTRAVENOUS at 12:20

## 2025-07-30 RX ADMIN — CEFTRIAXONE SODIUM 2 G: 2 INJECTION, SOLUTION INTRAVENOUS at 09:55

## 2025-07-30 ASSESSMENT — PAIN - FUNCTIONAL ASSESSMENT
PAIN_FUNCTIONAL_ASSESSMENT: 0-10

## 2025-07-30 ASSESSMENT — PAIN SCALES - GENERAL
PAINLEVEL_OUTOF10: 0 - NO PAIN
PAINLEVEL_OUTOF10: 7
PAINLEVEL_OUTOF10: 0 - NO PAIN

## 2025-07-30 NOTE — PRE-PROCEDURE NOTE
Interventional Radiology Preprocedure Note    Indication for procedure: The encounter diagnosis was Benign prostatic hyperplasia with urinary retention.    Relevant review of systems: NA    Relevant Labs:   Lab Results   Component Value Date    CREATININE 1.23 07/28/2025    EGFR 55 (L) 07/28/2025    INR 1.0 07/28/2025    PROTIME 11.1 07/28/2025       Planned Sedation/Anesthesia: Moderate    Airway assessment: normal    Directed physical examination:    Patient is alert and oriented. NAD. Breathing comfortably at rest.     Mallampati: II (hard and soft palate, upper portion of tonsils and uvula visible)    ASA Score: ASA 3 - Patient with moderate systemic disease with functional limitations    Benefits, risks and alternatives of procedure and planned sedation have been discussed with the patient and/or their representative. All questions answered and they agree to proceed.

## 2025-07-30 NOTE — POST-PROCEDURE NOTE
Interventional Radiology Brief Postprocedure Note    Attending: Dr. Jimenez    Assistant: Dr. Willis    Diagnosis: BPH    Description of procedure: following IV CTX abx ppx, right femoral arterial approach robust emobolization of left prostatic artery. Subsequently, there was less robust embolization of right prostatic artery given more irregular collateralization  (including a dominant penile artery) and more diminutive supply to right prostatic lobe.      Anesthesia:  moderate and local     Complications: None    Estimated Blood Loss: minimal    Medications (Filter: Administrations occurring from 0941 to 1233 on 07/30/25) As of 07/30/25 1233      cefTRIAXone (Rocephin) 2 g in dextrose (iso) IV 50 mL (mL/hr) Total volume:  Not documented*   *Total volume has not been documented. View each administration to see the amount administered.     Date/Time Rate/Dose/Volume Action       07/30/25  0955 2 g - 100 mL/hr (over 30 min) New Bag               fentaNYL PF (Sublimaze) injection (mcg) Total dose:  50 mcg      Date/Time Rate/Dose/Volume Action       07/30/25  1000 25 mcg Given      1100 25 mcg Given               midazolam (Versed) injection (mg) Total dose:  1 mg      Date/Time Rate/Dose/Volume Action       07/30/25  1000 0.5 mg Given      1100 0.5 mg Given               nitroglycerin (Tridil) 50 mg in dextrose 5% 250 mL (0.2 mg/mL) infusion (premix) (mcg) Total dose:  150 mcg      Date/Time Rate/Dose/Volume Action       07/30/25  1047 150 mcg New Bag               nitroglycerin (Tridil) 50 mg in dextrose 5% 250 mL (0.2 mg/mL) infusion (premix) (mL/hr) Total volume:  Not documented*   *Total volume has not been documented. View each administration to see the amount administered.     Date/Time Rate/Dose/Volume Action       07/30/25  1155 100 mcg - 0 mL/hr New Bag               iohexol (OMNIPaque) 350 mg iodine/mL solution 100 mL (mL) Total volume:  100 mL      Date/Time Rate/Dose/Volume Action       07/30/25  1220 100 mL  Given                   No specimens collected      See detailed result report with images in PACS.    The patient tolerated the procedure well without incident or complication and is in stable condition.

## 2025-07-30 NOTE — DISCHARGE INSTRUCTIONS
You received moderate sedation:  - Do not drive a car, or operate any machinery or power tools of any kind.  - Do not drink any alcoholic drinks.  - Do not take any over the counter medications that may cause drowsiness.  - Do not make any important decisions or sign any legal documents.  - You need to have a responsible adult accompany you home.  - You may resume your normal diet.  - We strongly suggest that a responsible adult be with you for the rest of the day and also during the night. This is for your protection and safety.     For questions related to your procedure:  Please call [phone] between the hours of 7:00am-5:00pm Monday through Friday.  Please call [phone] after 5:00pm and on weekends and holidays.     In the event of an emergency call 911 or go to your nearest emergency room.

## 2025-08-03 ENCOUNTER — PREP FOR PROCEDURE (OUTPATIENT)
Dept: RADIOLOGY | Facility: HOSPITAL | Age: OVER 89
End: 2025-08-03
Payer: MEDICARE

## 2025-08-03 DIAGNOSIS — N40.1 BENIGN PROSTATIC HYPERPLASIA WITH URINARY RETENTION: Primary | ICD-10-CM

## 2025-08-03 DIAGNOSIS — R33.8 BENIGN PROSTATIC HYPERPLASIA WITH URINARY RETENTION: Primary | ICD-10-CM

## 2025-08-18 DIAGNOSIS — I10 PRIMARY HYPERTENSION: ICD-10-CM

## 2025-08-20 ENCOUNTER — HOSPITAL ENCOUNTER (EMERGENCY)
Facility: HOSPITAL | Age: OVER 89
Discharge: HOME | End: 2025-08-21
Attending: STUDENT IN AN ORGANIZED HEALTH CARE EDUCATION/TRAINING PROGRAM
Payer: MEDICARE

## 2025-08-20 ENCOUNTER — OFFICE VISIT (OUTPATIENT)
Dept: UROLOGY | Facility: HOSPITAL | Age: OVER 89
End: 2025-08-20
Payer: MEDICARE

## 2025-08-20 DIAGNOSIS — N40.1 BENIGN PROSTATIC HYPERPLASIA WITH URINARY RETENTION: Primary | ICD-10-CM

## 2025-08-20 DIAGNOSIS — Z48.89 POSTOPERATIVE VISIT: ICD-10-CM

## 2025-08-20 DIAGNOSIS — R33.8 BENIGN PROSTATIC HYPERPLASIA WITH URINARY RETENTION: Primary | ICD-10-CM

## 2025-08-20 DIAGNOSIS — R33.9 URINARY RETENTION: ICD-10-CM

## 2025-08-20 DIAGNOSIS — T83.511A URINARY TRACT INFECTION ASSOCIATED WITH INDWELLING URETHRAL CATHETER, INITIAL ENCOUNTER: Primary | ICD-10-CM

## 2025-08-20 DIAGNOSIS — N39.0 URINARY TRACT INFECTION ASSOCIATED WITH INDWELLING URETHRAL CATHETER, INITIAL ENCOUNTER: Primary | ICD-10-CM

## 2025-08-20 LAB
APPEARANCE UR: CLEAR
BILIRUB UR STRIP.AUTO-MCNC: NEGATIVE MG/DL
COLOR UR: ABNORMAL
GLUCOSE UR STRIP.AUTO-MCNC: NORMAL MG/DL
KETONES UR STRIP.AUTO-MCNC: NEGATIVE MG/DL
LEUKOCYTE ESTERASE UR QL STRIP.AUTO: ABNORMAL
NITRITE UR QL STRIP.AUTO: ABNORMAL
PH UR STRIP.AUTO: 7 [PH]
PROT UR STRIP.AUTO-MCNC: NEGATIVE MG/DL
RBC # UR STRIP.AUTO: NEGATIVE MG/DL
RBC #/AREA URNS AUTO: ABNORMAL /HPF
SP GR UR STRIP.AUTO: 1.01
UROBILINOGEN UR STRIP.AUTO-MCNC: NORMAL MG/DL
WBC #/AREA URNS AUTO: ABNORMAL /HPF

## 2025-08-20 PROCEDURE — 99213 OFFICE O/P EST LOW 20 MIN: CPT | Performed by: NURSE PRACTITIONER

## 2025-08-20 PROCEDURE — 99284 EMERGENCY DEPT VISIT MOD MDM: CPT

## 2025-08-20 PROCEDURE — 51798 US URINE CAPACITY MEASURE: CPT

## 2025-08-20 PROCEDURE — 1126F AMNT PAIN NOTED NONE PRSNT: CPT | Performed by: NURSE PRACTITIONER

## 2025-08-20 PROCEDURE — 81001 URINALYSIS AUTO W/SCOPE: CPT

## 2025-08-20 PROCEDURE — 1159F MED LIST DOCD IN RCRD: CPT | Performed by: NURSE PRACTITIONER

## 2025-08-20 PROCEDURE — G2211 COMPLEX E/M VISIT ADD ON: HCPCS | Performed by: NURSE PRACTITIONER

## 2025-08-20 PROCEDURE — 1160F RVW MEDS BY RX/DR IN RCRD: CPT | Performed by: NURSE PRACTITIONER

## 2025-08-20 PROCEDURE — 51702 INSERT TEMP BLADDER CATH: CPT

## 2025-08-20 PROCEDURE — 51798 US URINE CAPACITY MEASURE: CPT | Performed by: NURSE PRACTITIONER

## 2025-08-20 PROCEDURE — 2500000001 HC RX 250 WO HCPCS SELF ADMINISTERED DRUGS (ALT 637 FOR MEDICARE OP)

## 2025-08-20 RX ORDER — ALFUZOSIN HYDROCHLORIDE 10 MG/1
10 TABLET, EXTENDED RELEASE ORAL ONCE
Status: COMPLETED | OUTPATIENT
Start: 2025-08-20 | End: 2025-08-21

## 2025-08-20 RX ORDER — DONEPEZIL HYDROCHLORIDE 10 MG/1
10 TABLET, FILM COATED ORAL NIGHTLY
Status: DISCONTINUED | OUTPATIENT
Start: 2025-08-20 | End: 2025-08-21 | Stop reason: HOSPADM

## 2025-08-20 RX ORDER — ALFUZOSIN HYDROCHLORIDE 10 MG/1
10 TABLET, EXTENDED RELEASE ORAL DAILY
Status: DISCONTINUED | OUTPATIENT
Start: 2025-08-21 | End: 2025-08-20

## 2025-08-20 RX ADMIN — DONEPEZIL HYDROCHLORIDE 10 MG: 10 TABLET, FILM COATED ORAL at 22:35

## 2025-08-20 ASSESSMENT — LIFESTYLE VARIABLES
HAVE YOU EVER FELT YOU SHOULD CUT DOWN ON YOUR DRINKING: NO
TOTAL SCORE: 0
HAVE PEOPLE ANNOYED YOU BY CRITICIZING YOUR DRINKING: NO
EVER HAD A DRINK FIRST THING IN THE MORNING TO STEADY YOUR NERVES TO GET RID OF A HANGOVER: NO
EVER FELT BAD OR GUILTY ABOUT YOUR DRINKING: NO

## 2025-08-20 ASSESSMENT — PAIN SCALES - GENERAL
PAINLEVEL_OUTOF10: 7
PAINLEVEL_OUTOF10: 0-NO PAIN
PAINLEVEL_OUTOF10: 0 - NO PAIN

## 2025-08-20 ASSESSMENT — PAIN DESCRIPTION - DESCRIPTORS: DESCRIPTORS: DISCOMFORT;TIGHTNESS

## 2025-08-20 ASSESSMENT — PAIN - FUNCTIONAL ASSESSMENT: PAIN_FUNCTIONAL_ASSESSMENT: 0-10

## 2025-08-20 ASSESSMENT — PAIN DESCRIPTION - ONSET: ONSET: ONGOING

## 2025-08-20 ASSESSMENT — PAIN DESCRIPTION - ORIENTATION: ORIENTATION: LOWER

## 2025-08-20 ASSESSMENT — PAIN DESCRIPTION - LOCATION: LOCATION: ABDOMEN

## 2025-08-20 ASSESSMENT — PAIN DESCRIPTION - PROGRESSION: CLINICAL_PROGRESSION: NOT CHANGED

## 2025-08-21 VITALS
RESPIRATION RATE: 14 BRPM | TEMPERATURE: 97.2 F | HEART RATE: 65 BPM | OXYGEN SATURATION: 96 % | SYSTOLIC BLOOD PRESSURE: 147 MMHG | BODY MASS INDEX: 20.04 KG/M2 | WEIGHT: 140 LBS | HEIGHT: 70 IN | DIASTOLIC BLOOD PRESSURE: 78 MMHG

## 2025-08-21 PROCEDURE — 2500000001 HC RX 250 WO HCPCS SELF ADMINISTERED DRUGS (ALT 637 FOR MEDICARE OP)

## 2025-08-21 RX ORDER — CEPHALEXIN 500 MG/1
500 CAPSULE ORAL 2 TIMES DAILY
Qty: 20 CAPSULE | Refills: 0 | Status: SHIPPED | OUTPATIENT
Start: 2025-08-21 | End: 2025-08-21

## 2025-08-21 RX ORDER — CEPHALEXIN 250 MG/1
500 CAPSULE ORAL ONCE
Status: COMPLETED | OUTPATIENT
Start: 2025-08-21 | End: 2025-08-21

## 2025-08-21 RX ORDER — CEPHALEXIN 500 MG/1
500 CAPSULE ORAL 2 TIMES DAILY
Qty: 20 CAPSULE | Refills: 0 | Status: SHIPPED | OUTPATIENT
Start: 2025-08-21 | End: 2025-08-31

## 2025-08-21 RX ADMIN — CEPHALEXIN 500 MG: 250 CAPSULE ORAL at 00:43

## 2025-08-21 RX ADMIN — ALFUZOSIN HYDROCHLORIDE 10 MG: 10 TABLET, EXTENDED RELEASE ORAL at 00:05

## 2025-09-03 ENCOUNTER — OFFICE VISIT (OUTPATIENT)
Dept: UROLOGY | Facility: HOSPITAL | Age: OVER 89
End: 2025-09-03
Payer: MEDICARE

## 2025-09-03 DIAGNOSIS — N40.1 BENIGN PROSTATIC HYPERPLASIA WITH URINARY RETENTION: Primary | ICD-10-CM

## 2025-09-03 DIAGNOSIS — R33.8 BENIGN PROSTATIC HYPERPLASIA WITH URINARY RETENTION: Primary | ICD-10-CM

## 2025-09-03 PROCEDURE — 51700 IRRIGATION OF BLADDER: CPT | Performed by: NURSE PRACTITIONER

## 2025-09-03 PROCEDURE — 99212 OFFICE O/P EST SF 10 MIN: CPT

## 2025-09-03 PROCEDURE — 1159F MED LIST DOCD IN RCRD: CPT | Performed by: NURSE PRACTITIONER

## 2025-09-03 PROCEDURE — 99214 OFFICE O/P EST MOD 30 MIN: CPT | Performed by: NURSE PRACTITIONER

## 2025-09-03 PROCEDURE — 51702 INSERT TEMP BLADDER CATH: CPT | Performed by: NURSE PRACTITIONER

## 2025-09-03 PROCEDURE — 1160F RVW MEDS BY RX/DR IN RCRD: CPT | Performed by: NURSE PRACTITIONER

## 2025-09-30 ENCOUNTER — APPOINTMENT (OUTPATIENT)
Dept: PRIMARY CARE | Facility: CLINIC | Age: OVER 89
End: 2025-09-30
Payer: MEDICARE

## 2026-07-02 ENCOUNTER — APPOINTMENT (OUTPATIENT)
Dept: PRIMARY CARE | Facility: CLINIC | Age: OVER 89
End: 2026-07-02
Payer: MEDICARE